# Patient Record
Sex: FEMALE | Race: WHITE | Employment: OTHER | URBAN - METROPOLITAN AREA
[De-identification: names, ages, dates, MRNs, and addresses within clinical notes are randomized per-mention and may not be internally consistent; named-entity substitution may affect disease eponyms.]

---

## 2016-12-08 LAB — HM DEXA SCAN: NORMAL

## 2017-02-20 ENCOUNTER — APPOINTMENT (OUTPATIENT)
Dept: GENERAL RADIOLOGY | Age: 67
End: 2017-02-20
Attending: PHYSICIAN ASSISTANT
Payer: MEDICARE

## 2017-02-20 ENCOUNTER — HOSPITAL ENCOUNTER (OUTPATIENT)
Age: 67
Discharge: HOME | End: 2017-02-20
Payer: MEDICARE

## 2017-02-20 VITALS
HEIGHT: 61 IN | BODY MASS INDEX: 31.53 KG/M2 | OXYGEN SATURATION: 100 % | SYSTOLIC BLOOD PRESSURE: 129 MMHG | WEIGHT: 167 LBS | TEMPERATURE: 98 F | HEART RATE: 86 BPM | RESPIRATION RATE: 20 BRPM | DIASTOLIC BLOOD PRESSURE: 73 MMHG

## 2017-02-20 DIAGNOSIS — S63.612A SPRAIN OF RIGHT MIDDLE FINGER, UNSPECIFIED SITE OF FINGER, INITIAL ENCOUNTER: Primary | ICD-10-CM

## 2017-02-20 DIAGNOSIS — S63.614A SPRAIN OF RIGHT RING FINGER, UNSPECIFIED SITE OF FINGER, INITIAL ENCOUNTER: ICD-10-CM

## 2017-02-20 PROCEDURE — 99213 OFFICE O/P EST LOW 20 MIN: CPT

## 2017-02-20 PROCEDURE — 99203 OFFICE O/P NEW LOW 30 MIN: CPT

## 2017-02-20 PROCEDURE — 73130 X-RAY EXAM OF HAND: CPT

## 2017-02-20 PROCEDURE — 29130 APPL FINGER SPLINT STATIC: CPT

## 2017-02-20 NOTE — ED PROVIDER NOTES
Patient Seen in: [facility] In [address] [place] [address],[address]    History   Patient presents with:  Hand Pain    Stated Complaint: r hand injury s/p fall at home    HPI    Patient is a pleasant 25-year-old female.   2 days prior to arrival, patient rolled out of b 20 mg by mouth every morning before breakfast. Indications: Gastroesophageal Reflux Disease   Magnesium 100 MG Oral Tab,  Take by mouth. GuaiFENesin (MUCUS RELIEF ADULT OR),  Take  by mouth as needed.        Family History   Problem Relation Age of Onset bilaterally. ED Course   Labs Reviewed - No data to display  Xr Hand (min 3 Views), Right (cpt=73130)    2/20/2017  PROCEDURE:  XR HAND (MIN 3 VIEWS), RIGHT (CPT=73130)  TECHNIQUE:  Three views were obtained. COMPARISON:  None.   INDICATIONS:  r hand Impression:  Sprain of right middle finger, unspecified site of finger, initial encounter  (primary encounter diagnosis)  Sprain of right ring finger, unspecified site of finger, initial encounter    Disposition:  Discharge    Follow-up:  [facility],

## 2017-02-20 NOTE — ED INITIAL ASSESSMENT (HPI)
Patient states she fell out of bed 2 nights ago and hit her hand on a plastic container. Patient is unable to make a fist and also has bruising noted to the right hand.

## 2017-03-20 ENCOUNTER — APPOINTMENT (OUTPATIENT)
Dept: GENERAL RADIOLOGY | Age: 67
End: 2017-03-20
Attending: FAMILY MEDICINE
Payer: MEDICARE

## 2017-03-20 ENCOUNTER — HOSPITAL ENCOUNTER (OUTPATIENT)
Age: 67
Discharge: HOME | End: 2017-03-20
Attending: FAMILY MEDICINE
Payer: MEDICARE

## 2017-03-20 VITALS
DIASTOLIC BLOOD PRESSURE: 77 MMHG | SYSTOLIC BLOOD PRESSURE: 131 MMHG | BODY MASS INDEX: 32.1 KG/M2 | WEIGHT: 170 LBS | TEMPERATURE: 97 F | HEIGHT: 61 IN | HEART RATE: 68 BPM | RESPIRATION RATE: 20 BRPM | OXYGEN SATURATION: 97 %

## 2017-03-20 DIAGNOSIS — S62.654A CLOSED NONDISPLACED FRACTURE OF MIDDLE PHALANX OF RIGHT RING FINGER, INITIAL ENCOUNTER: ICD-10-CM

## 2017-03-20 DIAGNOSIS — S62.652A CLOSED NONDISPLACED FRACTURE OF MIDDLE PHALANX OF RIGHT MIDDLE FINGER, INITIAL ENCOUNTER: Primary | ICD-10-CM

## 2017-03-20 PROCEDURE — 26720 TREAT FINGER FRACTURE EACH: CPT

## 2017-03-20 PROCEDURE — 73130 X-RAY EXAM OF HAND: CPT

## 2017-03-20 PROCEDURE — 99213 OFFICE O/P EST LOW 20 MIN: CPT

## 2017-03-20 PROCEDURE — 99212 OFFICE O/P EST SF 10 MIN: CPT

## 2017-03-20 NOTE — ED INITIAL ASSESSMENT (HPI)
Pt states seen here 1 month ago for right hand injury s/p fall out of bed. States pain improved slightly but still with swelling to right 3rd and 4th fingers and pain to all fingers. Pain most prominent to 3rd and 4th. Denies numbness or tingling.

## 2017-03-21 NOTE — ED PROVIDER NOTES
Patient Seen in: [facility] In [address] [place] [address] Street,[address]    History   Patient presents with:  Upper Extremity Injury (musculoskeletal)    Stated Complaint: R HAND FINGER PAIN     HPI  27-year-old female coming in with complaints of right hand injury status Indications: Hayfever   GuaiFENesin (MUCUS RELIEF ADULT OR),  Take  by mouth as needed.    omeprazole (PRILOSEC) 20 MG Oral Capsule Delayed Release,  Take 20 mg by mouth every morning before breakfast. Indications: Gastroesophageal Reflux Disease       Fami normal   - Cap refill: normal   - sensation: rylie   - Motor exam/strength/hand : as above - normal strength     ED Course   Labs Reviewed - No data to display    Orders Placed This Encounter  XR HAND (MIN 3 VIEWS), RIGHT (CPT=60457) Once  Order Commen fracture dislocation. This is not definite.    Dictated by: Renaldo Malone MD on 3/20/2017 at 19:39     Approved by: Renaldo Malone MD            Xr Hand (min 3 Views), Right (cpt=73130)    2/20/2017  PROCEDURE:  XR HAND (MIN 3 VIEWS), RIGHT (CPT=7313 finger, initial encounter  (primary encounter diagnosis)  Closed nondisplaced fracture of middle phalanx of right ring finger, initial encounter    Disposition:  Discharge    Follow-up:  Garo Rand, [address] [address]

## 2017-04-28 PROBLEM — S62.654A CLOSED NONDISPLACED FRACTURE OF MIDDLE PHALANX OF RIGHT RING FINGER, INITIAL ENCOUNTER: Status: ACTIVE | Noted: 2017-04-28

## 2019-08-01 NOTE — ED AVS SNAPSHOT
[facility] in [address]    [address]    Phone:  [phone]    Fax:  Debi   MRN: [id number]    Department:  THE [facility] in [address] [place] [address],[address]   Date of Visit:  3/20/2017 Loli Newsome 25, [place] ProcXimena Caro 1   [phone]       To Check ER Wait Times:  TEXT 'ERwait' to 68361      Click [email or web address]      Or call [phone]    If you have any problems with your follow-up, please call our  at [phone] I have read and understand the instructions given to me by my caregivers. 24-Hour Pharmacies        Pharmacy Address Phone Number   [facility] [address] ([address]) Erinn Kim TECHNIQUE:  Three views were obtained. COMPARISON:  RENEE JOHNSTON HAND (MIN 3 VIEWS), RIGHT (CPT=73130), 2/20/2017, 16:38.      INDICATIONS:  R HAND FINGER PAIN     PATIENT STATED HISTORY: (As transcribed by Technologist)  Injury one month ago to [facility] For medical emergencies, dial 911. Chonodrocutaneous Helical Advancement Flap Text: The defect edges were debeveled with a #15 scalpel blade.  Given the location of the defect and the proximity to free margins a chondrocutaneous helical advancement flap was deemed most appropriate.  Using a sterile surgical marker, the appropriate advancement flap was drawn incorporating the defect and placing the expected incisions within the relaxed skin tension lines where possible.    The area thus outlined was incised deep to adipose tissue with a #15 scalpel blade.  The skin margins were undermined to an appropriate distance in all directions utilizing iris scissors.

## 2020-10-05 ENCOUNTER — OFFICE VISIT (OUTPATIENT)
Dept: NEUROLOGY | Facility: CLINIC | Age: 70
End: 2020-10-05
Payer: MEDICARE

## 2020-10-05 VITALS
WEIGHT: 170 LBS | SYSTOLIC BLOOD PRESSURE: 132 MMHG | DIASTOLIC BLOOD PRESSURE: 88 MMHG | BODY MASS INDEX: 32 KG/M2 | HEART RATE: 88 BPM | RESPIRATION RATE: 14 BRPM

## 2020-10-05 DIAGNOSIS — R42 DIZZINESS: Primary | ICD-10-CM

## 2020-10-05 PROCEDURE — 99204 OFFICE O/P NEW MOD 45 MIN: CPT | Performed by: OTHER

## 2020-10-05 NOTE — PROGRESS NOTES
[facility]    Date of consult: 10/5/2020    CC/Reason for consult: dizziness, leg movement, memory issue  Consult Requested by Honey Ramírez MD    HPI: [place] is a 69year old female with past medical history as listed be Gastroesophageal Reflux Disease, Disp: , Rfl:   •  D3-50 1.25 MG (70521 UT) Oral Cap, Take 1.25 mg by mouth once a week., Disp: , Rfl:   •  Potassium Chloride ER 20 MEQ Oral Tab CR, Take 2 tablets by mouth 2 (two) times daily. , Disp: , Rfl:   Allergies: symmetric  Gait:deferred, on wheelchair  Romberg: deferred  Neck: supple    Data Reviewed on 10/5/2020  Notes Reviewed on 10/5/2020  Labs Reviewed  on 10/5/2020    Assessment & Plan:  Overall complicated, multiple neurological conditions that are challengi

## 2020-10-05 NOTE — PROGRESS NOTES
Pt here as n/p. Referred for dizziness, involuntary leg movements and some memory loss.   Pt  states it's been going on since surgery in august.

## 2020-10-09 ENCOUNTER — NURSE ONLY (OUTPATIENT)
Dept: ELECTROPHYSIOLOGY | Facility: HOSPITAL | Age: 70
End: 2020-10-09
Attending: Other
Payer: MEDICARE

## 2020-10-09 DIAGNOSIS — R42 DIZZINESS: ICD-10-CM

## 2020-10-09 PROCEDURE — 95816 EEG AWAKE AND DROWSY: CPT | Performed by: OTHER

## 2020-10-13 NOTE — PROCEDURES
Date of Procedure: 10/9/2020    Procedure: EEG (ELECTROENCEPHALOGRAM)     DX:DIZZINESS  HX:A 69YO FEMALE WHO PRESENTS WITH SOME DIZZINESS, CONFUSION, FORGETFULLNESS, MEMORY LOSS. PT COMPLAINS OF ALL THESE SYMPTOMS.  PT ALSO HAS SOME INVOLUNTARY LEG Honora Se

## 2020-10-14 ENCOUNTER — HOSPITAL ENCOUNTER (OUTPATIENT)
Dept: MRI IMAGING | Age: 70
Discharge: HOME | End: 2020-10-14
Attending: Other
Payer: MEDICARE

## 2020-10-14 DIAGNOSIS — R42 DIZZINESS: ICD-10-CM

## 2020-10-14 PROCEDURE — 70544 MR ANGIOGRAPHY HEAD W/O DYE: CPT | Performed by: OTHER

## 2020-10-14 PROCEDURE — 70547 MR ANGIOGRAPHY NECK W/O DYE: CPT | Performed by: OTHER

## 2020-10-14 PROCEDURE — 70551 MRI BRAIN STEM W/O DYE: CPT | Performed by: OTHER

## 2020-10-21 PROBLEM — F33.0 MAJOR DEPRESSIVE DISORDER, RECURRENT EPISODE, MILD DEGREE (HCC): Status: ACTIVE | Noted: 2020-10-21

## 2020-10-21 PROBLEM — R06.6 HICCOUGHS: Status: ACTIVE | Noted: 2020-10-21

## 2020-10-21 PROBLEM — G25.81 RESTLESS LEG SYNDROME: Status: ACTIVE | Noted: 2020-10-21

## 2020-10-21 PROBLEM — R41.3 MEMORY DEFICITS: Status: ACTIVE | Noted: 2020-10-21

## 2020-11-05 ENCOUNTER — OFFICE VISIT (OUTPATIENT)
Dept: NEUROLOGY | Facility: CLINIC | Age: 70
End: 2020-11-05
Payer: MEDICARE

## 2020-11-05 VITALS
SYSTOLIC BLOOD PRESSURE: 136 MMHG | BODY MASS INDEX: 24 KG/M2 | HEART RATE: 84 BPM | WEIGHT: 127 LBS | DIASTOLIC BLOOD PRESSURE: 70 MMHG | RESPIRATION RATE: 16 BRPM

## 2020-11-05 DIAGNOSIS — R42 DIZZINESS: Primary | ICD-10-CM

## 2020-11-05 PROCEDURE — 99214 OFFICE O/P EST MOD 30 MIN: CPT | Performed by: OTHER

## 2020-11-05 RX ORDER — METOCLOPRAMIDE 5 MG/1
1 TABLET ORAL 2 TIMES DAILY
COMMUNITY
Start: 2020-10-28

## 2020-11-05 NOTE — PROGRESS NOTES
[facility] Neurology outpatient progress note  Date of service: 11/5/2020    Patient here for a follow-up visit for dizziness, leg movement, hiccups. Since last visit her PCP started reglan for hiccups; she had MRI and EEG done, we reviewed results today.   Her di Calcium (LIPITOR) 40 MG Oral Tab, Take 40 mg by mouth nightly., Disp: , Rfl:   •  omeprazole (PRILOSEC) 20 MG Oral Capsule Delayed Release, Take 20 mg by mouth every morning before breakfast. Indications: Gastroesophageal Reflux Disease, Disp: , Rfl:   • disorder     Plan:  Testes results reviewed  Pt is on reglan by primary, seems slightly better per pt but I do not recommend long term use of such medication  I will see pt in one month, and reassess, perhaps try different medication for her hiccups, tremo

## 2020-11-18 ENCOUNTER — OFFICE VISIT (OUTPATIENT)
Dept: PHYSICAL THERAPY | Age: 70
End: 2020-11-18
Attending: FAMILY MEDICINE
Payer: MEDICARE

## 2020-11-18 PROCEDURE — 97163 PT EVAL HIGH COMPLEX 45 MIN: CPT

## 2020-11-18 PROCEDURE — 97110 THERAPEUTIC EXERCISES: CPT

## 2020-11-18 NOTE — PROGRESS NOTES
VESTIBULAR EVALUATION:   Referring Physician: Dr. Millie Hale  Diagnosis: Dizziness (R42)      Date of Service: 11/18/2020     PATIENT Elsi Huffman is a 69year old female who presents to therapy today with reports of dizziness for approx the past 3 turns or movement. Social history:  Lives with , no stairs  [place] describes prior level of function independent.   Has not driven for 2 years  Pt goals include get rid of the dizziness, be able to walk and get better balance  Past medical history wa [facility]): N/A     Postural Control:   Romberg: EO 30 sec, EC 3 sec   SLS: unable       Functional Mobility:   Gait: pt ambulates on level ground with assistive device of RW or SBA/DGA of . Or prefers to touch wall/furniture.  at home     Today's Treatme exercise program development and instruction, patient/family education, balance training, canalith repositioning maneuver, eye/head coordination exercises, sensory organization training, optokinetic stimulation , gait training and strengthening.      Oma Cain

## 2020-11-19 ENCOUNTER — OFFICE VISIT (OUTPATIENT)
Dept: PHYSICAL THERAPY | Age: 70
End: 2020-11-19
Attending: Other
Payer: MEDICARE

## 2020-11-19 ENCOUNTER — APPOINTMENT (OUTPATIENT)
Dept: PHYSICAL THERAPY | Age: 70
End: 2020-11-19
Attending: FAMILY MEDICINE
Payer: MEDICARE

## 2020-11-19 PROCEDURE — 97110 THERAPEUTIC EXERCISES: CPT

## 2020-11-19 PROCEDURE — 97112 NEUROMUSCULAR REEDUCATION: CPT

## 2020-11-19 NOTE — PROGRESS NOTES
Dx: Dizziness (R42)            Insurance (Authorized # of Visits):  8           Authorizing Physician: Dr. Nathaniel Garcia MD visit: 11/23  Fall Risk: elevated         Precautions: cancer             Subjective: Doing the exercises with help of .   Sasha in place with RW       Charges: TEx2  NR x 2       Total Timed Treatment: 55 min  Total Treatment Time: 55 min

## 2020-11-23 ENCOUNTER — VIRTUAL PHONE E/M (OUTPATIENT)
Dept: NEUROLOGY | Facility: CLINIC | Age: 70
End: 2020-11-23
Payer: MEDICARE

## 2020-11-23 ENCOUNTER — OFFICE VISIT (OUTPATIENT)
Dept: PHYSICAL THERAPY | Age: 70
End: 2020-11-23
Attending: Other
Payer: MEDICARE

## 2020-11-23 DIAGNOSIS — R42 DIZZINESS: Primary | ICD-10-CM

## 2020-11-23 PROCEDURE — 99442 PHONE E/M BY PHYS 11-20 MIN: CPT | Performed by: OTHER

## 2020-11-23 PROCEDURE — 97110 THERAPEUTIC EXERCISES: CPT

## 2020-11-23 PROCEDURE — 97112 NEUROMUSCULAR REEDUCATION: CPT

## 2020-11-23 NOTE — PROGRESS NOTES
Dx: Dizziness (R42)            Insurance (Authorized # of Visits):  [facility] [facility] Physician: Dr. Pretty Brown  Next MD visit: 11/23  Fall Risk: elevated         Precautions: cancer             Subjective: Pt states she still gets a little dizzy when sh ASHANTI      NR 30'  Gait with RW head turns 100'x2  P bars balance  -lat walk 6L,   -DL heel raise 10x  -standing march with UE support  Standing head turns  STS 10x hands on knees 10x2  Wt shifts 10x   NR  Standing balance - march 10x2  Wt shifts 10x  DL nicole

## 2020-11-23 NOTE — PROGRESS NOTES
Virtual/Telephone Visit Note     Date of service: 11/23/2020    Prince Clay verbally consents to a Virtual/Telephone Visit service on 11/23/2020.   Patient understands and accepts financial responsibility for any deductible, co-insurance and/or co-pays ass (81938 UT) Oral Cap, Take 1.25 mg by mouth once a week., Disp: , Rfl:   •  Memantine HCl 10 MG Oral Tab, Take 10 mg by mouth daily. , Disp: , Rfl:   •  NIFEdipine ER 60 MG Oral Tablet 24 Hr, Take 60 mg by mouth daily.  ON EMPTY STOMACH, Disp: , Rfl:   •  O 11/23/2020    Assessment & Plan:  Dizziness  (primary encounter diagnosis): slightly better, some feature of postural dizziness     Plan:  Pt is on reglan by primary, I do not recommend long term use of such medication and advised 2 times per week as neede

## 2020-11-25 ENCOUNTER — OFFICE VISIT (OUTPATIENT)
Dept: PHYSICAL THERAPY | Age: 70
End: 2020-11-25
Attending: Other
Payer: MEDICARE

## 2020-11-25 PROCEDURE — 97110 THERAPEUTIC EXERCISES: CPT

## 2020-11-25 PROCEDURE — 97112 NEUROMUSCULAR REEDUCATION: CPT

## 2020-11-25 NOTE — PROGRESS NOTES
Dx: Dizziness (R42)            Insurance (Authorized # of Visits):  [facility] [facility] Physician: Dr. Lamonte Gutierrez  Next MD visit: 11/23  Fall Risk: elevated         Precautions: cancer 2001 hx             Subjective: Pt states she has \"a little bit of diz sec.  HEP additions TE 25'  Seated stepper 5'  Slant board calf stretch 30\"x4  Pt ed pacing vs habituation. Reviewed safety principles.  Use of RW and wider ASHANTI TE 10'  Seated stepper 5'  Slant board calf stretch 30\"x4   VOR    NR 30'  Gait with RW head t

## 2020-11-27 ENCOUNTER — APPOINTMENT (OUTPATIENT)
Dept: PHYSICAL THERAPY | Age: 70
End: 2020-11-27
Attending: Other
Payer: MEDICARE

## 2020-12-02 ENCOUNTER — OFFICE VISIT (OUTPATIENT)
Dept: PHYSICAL THERAPY | Age: 70
End: 2020-12-02
Attending: Other
Payer: MEDICARE

## 2020-12-02 DIAGNOSIS — R42 DIZZINESS: ICD-10-CM

## 2020-12-02 PROCEDURE — 97112 NEUROMUSCULAR REEDUCATION: CPT

## 2020-12-02 PROCEDURE — 97110 THERAPEUTIC EXERCISES: CPT

## 2020-12-02 NOTE — PROGRESS NOTES
Dx: Dizziness (R42)            Insurance (Authorized # of Visits):  [phone] Physician: Dr. Jeovany Garcia MD visit: 11/23  Fall Risk: elevated         Precautions: cancer 2001 hx             Subjective: Pt states she is feeling better.   She i stepper 5'  Slant board calf stretch 30\"x4  TUG with RW 17 sec. HEP additions TE 25'  Seated stepper 5'  Slant board calf stretch 30\"x4  Pt ed pacing vs habituation. Reviewed safety principles.  Use of RW and wider ASHANTI TE 10'  Seated stepper 5'  Slant taniya

## 2020-12-04 ENCOUNTER — OFFICE VISIT (OUTPATIENT)
Dept: PHYSICAL THERAPY | Age: 70
End: 2020-12-04
Attending: Other
Payer: MEDICARE

## 2020-12-04 DIAGNOSIS — R42 DIZZINESS: ICD-10-CM

## 2020-12-04 PROCEDURE — 97110 THERAPEUTIC EXERCISES: CPT

## 2020-12-04 PROCEDURE — 97112 NEUROMUSCULAR REEDUCATION: CPT

## 2020-12-04 NOTE — PROGRESS NOTES
Dx: Dizziness (R42)            Insurance (Authorized # of Visits):  [facility] Physician: Dr. Mariam Lincoln  Next MD visit: 11/23  Fall Risk: elevated         Precautions: cancer 2001 hx             Subjective: Pt states she has felt more balanced wa 30\"x4  TUG with RW 17 sec. HEP additions TE 25'  Seated stepper 5'  Slant board calf stretch 30\"x4  Pt ed pacing vs habituation. Reviewed safety principles.  Use of RW and wider ASHANTI TE 10'  Seated stepper 5'  Slant board calf stretch 30\"x4   TE  10'  Se

## 2020-12-07 ENCOUNTER — OFFICE VISIT (OUTPATIENT)
Dept: PHYSICAL THERAPY | Age: 70
End: 2020-12-07
Attending: Other
Payer: MEDICARE

## 2020-12-07 DIAGNOSIS — R42 DIZZINESS: ICD-10-CM

## 2020-12-07 PROCEDURE — 97112 NEUROMUSCULAR REEDUCATION: CPT

## 2020-12-07 PROCEDURE — 97110 THERAPEUTIC EXERCISES: CPT

## 2020-12-07 NOTE — PROGRESS NOTES
Dx: Dizziness (R42)            Insurance (Authorized # of Visits):  8           Authorizing Physician: Dr. Salazar Sosa  Next MD visit:   Fall Risk: elevated         Precautions: cancer 2001 hx             Subjective: Pt states she had been feeling good last wee 12/4/20  Tx#: 6/10 12/7/20  7/10     TE 25'  Seated stepper 5'  Slant board calf stretch 30\"x4  TUG with RW 17 sec. HEP additions TE 25'  Seated stepper 5'  Slant board calf stretch 30\"x4  Pt ed pacing vs habituation. Reviewed safety principles.  Use of Timed Treatment: 43 min  Total Treatment Time: 43 min

## 2020-12-10 ENCOUNTER — APPOINTMENT (OUTPATIENT)
Dept: PHYSICAL THERAPY | Age: 70
End: 2020-12-10
Attending: Other
Payer: MEDICARE

## 2020-12-14 ENCOUNTER — OFFICE VISIT (OUTPATIENT)
Dept: PHYSICAL THERAPY | Age: 70
End: 2020-12-14
Attending: Other
Payer: MEDICARE

## 2020-12-14 DIAGNOSIS — R42 DIZZINESS: ICD-10-CM

## 2020-12-14 PROCEDURE — 97112 NEUROMUSCULAR REEDUCATION: CPT

## 2020-12-14 PROCEDURE — 97110 THERAPEUTIC EXERCISES: CPT

## 2020-12-14 NOTE — PROGRESS NOTES
Dx: Dizziness (R42)            Insurance (Authorized # of Visits):  8           Authorizing Physician: Dr. Eleanor Bermudez  Next MD visit:   Fall Risk: elevated         Precautions: cancer 2001 hx             Subjective: Pt states she had a dizzy spell yesterday af TX#: 3/10 Date:  11/25/20               TX#: 4/10 Date: 12/2/20                TX#: 5/10 Date: 12/4/20  Tx#: 6/10 12/7/20  7/10 12/14/20  8/10    TE 25'  Seated stepper 5'  Slant board calf stretch 30\"x4  TUG with RW 17 sec.   HEP additions TE 25'  Seate trials  Standing in corner head turns, march, DL heel raises 10x2 with RW  STS 10x no UE's  Amb with RW head turns '                                                      HEP: handouts:   standing corner balance with support for safety.  Add slow head

## 2020-12-17 ENCOUNTER — APPOINTMENT (OUTPATIENT)
Dept: PHYSICAL THERAPY | Age: 70
End: 2020-12-17
Attending: Other
Payer: MEDICARE

## 2020-12-21 ENCOUNTER — OFFICE VISIT (OUTPATIENT)
Dept: PHYSICAL THERAPY | Age: 70
End: 2020-12-21
Attending: Other
Payer: MEDICARE

## 2020-12-21 DIAGNOSIS — R42 DIZZINESS: ICD-10-CM

## 2020-12-21 PROCEDURE — 97110 THERAPEUTIC EXERCISES: CPT

## 2020-12-21 PROCEDURE — 97112 NEUROMUSCULAR REEDUCATION: CPT

## 2020-12-21 NOTE — PROGRESS NOTES
Dx: Dizziness (R42)            Insurance (Authorized # of Visits):  8           Authorizing Physician: Dr. Anika Dumont  Next MD visit:   Fall Risk: elevated         Precautions: cancer 2001 hx             Subjective: Pt states dizziness is better but she will s TE 12'  Seated stepper 5'  Standing slant board calf stretch 3x  HEP review  Bridge 10x2 TE 12'  Seated stepper 5'  Standing slant board calf stretch 3x Visual motion  positional    NR 35'  Rhomberg eyes closed 4x trials    Standing balance - march 10x2  W

## 2020-12-28 ENCOUNTER — APPOINTMENT (OUTPATIENT)
Dept: PHYSICAL THERAPY | Age: 70
End: 2020-12-28
Attending: Other
Payer: MEDICARE

## 2020-12-28 ENCOUNTER — TELEPHONE (OUTPATIENT)
Dept: PHYSICAL THERAPY | Age: 70
End: 2020-12-28

## 2020-12-28 NOTE — TELEPHONE ENCOUNTER
Pt's  called to reschedule today's appt, pt overslept. States pt has followed up with PCP Dr. Gigi Alexis from [facility]. BP meds have been increased because BP was spiking. BP yesterday was 134/76.   Advised to continue to keep log and to follow u

## 2020-12-31 ENCOUNTER — OFFICE VISIT (OUTPATIENT)
Dept: PHYSICAL THERAPY | Age: 70
End: 2020-12-31
Attending: Other
Payer: MEDICARE

## 2020-12-31 PROCEDURE — 97110 THERAPEUTIC EXERCISES: CPT

## 2020-12-31 PROCEDURE — 97112 NEUROMUSCULAR REEDUCATION: CPT

## 2020-12-31 NOTE — PROGRESS NOTES
Dx: Dizziness (R42)            Insurance (Authorized # of Visits):  8           Authorizing Physician: Dr. Jp Ordonez  Next MD visit:  spring  Fall Risk: elevated         Precautions: cancer 2001 hx             Progress Summary  Pt has attended 10, cancelled 1 maintaining a straight line. --> met with RW  (I) stand eyes closed with narrow base of support and head movement for shower safety.  --> not met    Plan: Pt has completed 10/10 visits.   Pt would like to continue with HEP independently for the next 2-3 we precautions     NR 35'  Rhomberg eyes closed 4x trials    Standing balance - march 10x2  Wt shifts 10x2  DL heel raises 10x3  Head turns 10x    Sitting VOR 20\"x3 NR 33'  Rhomberg eyes closed 4x trials    Standing balance - march 10x2  Wt shifts 10x2  DL h

## 2021-03-03 DIAGNOSIS — Z23 NEED FOR VACCINATION: ICD-10-CM

## 2021-04-14 ENCOUNTER — TELEPHONE (OUTPATIENT)
Dept: PHYSICAL THERAPY | Age: 71
End: 2021-04-14

## 2021-04-15 NOTE — TELEPHONE ENCOUNTER
As discussed with pt, called to follow up regarding progress with PT exercises. Spoke with pt and , states she was hospitalized in Jan with infection but doing overall better now. Receiving home nursing and PT.

## 2021-08-24 ENCOUNTER — ORDER TRANSCRIPTION (OUTPATIENT)
Dept: ADMINISTRATIVE | Facility: HOSPITAL | Age: 71
End: 2021-08-24

## 2021-08-24 DIAGNOSIS — Z01.812 ENCOUNTER FOR PREPROCEDURE SCREENING LABORATORY TESTING FOR COVID-19: Primary | ICD-10-CM

## 2021-08-24 DIAGNOSIS — J44.9 COPD (CHRONIC OBSTRUCTIVE PULMONARY DISEASE) (HCC): Primary | ICD-10-CM

## 2021-08-24 DIAGNOSIS — Z20.822 ENCOUNTER FOR PREPROCEDURE SCREENING LABORATORY TESTING FOR COVID-19: Primary | ICD-10-CM

## 2021-08-28 ENCOUNTER — LAB ENCOUNTER (OUTPATIENT)
Dept: LAB | Age: 71
End: 2021-08-28
Attending: FAMILY MEDICINE
Payer: MEDICARE

## 2021-08-28 DIAGNOSIS — Z01.812 ENCOUNTER FOR PREPROCEDURE SCREENING LABORATORY TESTING FOR COVID-19: ICD-10-CM

## 2021-08-28 DIAGNOSIS — Z20.822 ENCOUNTER FOR PREPROCEDURE SCREENING LABORATORY TESTING FOR COVID-19: ICD-10-CM

## 2021-08-30 LAB — SARS-COV-2 RNA RESP QL NAA+PROBE: NOT DETECTED

## 2021-09-01 ENCOUNTER — RT VISIT (OUTPATIENT)
Dept: RESPIRATORY THERAPY | Facility: HOSPITAL | Age: 71
End: 2021-09-01
Attending: FAMILY MEDICINE
Payer: MEDICARE

## 2021-09-01 DIAGNOSIS — J44.9 COPD (CHRONIC OBSTRUCTIVE PULMONARY DISEASE) (HCC): ICD-10-CM

## 2021-09-01 PROCEDURE — 94010 BREATHING CAPACITY TEST: CPT

## 2021-09-03 NOTE — PROCEDURES
Findings:  FEV1 is 2.01L, 101% predicted. FVC is 2.25L, 87% predicted. FEV1/ FVC ratio is 0.90. The flow-volume loop demonstrates a normal pattern. Impression:  There is no airway obstruction on spirometry and visualized on flow-volume loop.   There a

## 2022-02-21 ENCOUNTER — TELEPHONE (OUTPATIENT)
Dept: PHYSICAL THERAPY | Age: 72
End: 2022-02-21

## 2022-02-22 NOTE — TELEPHONE ENCOUNTER
Pt and pt's  called today and left VM requesting to come back to PT. Returned pt call. Pt states same symptoms of dizziness has returned if she turns too quickly. No new or worsening symptoms. Reports currently feeling better than earlier today. Recommended contacting PCP to discuss symptoms and determine if PT referral is appropriate.

## 2022-03-03 ENCOUNTER — ORDER TRANSCRIPTION (OUTPATIENT)
Dept: PHYSICAL THERAPY | Facility: HOSPITAL | Age: 72
End: 2022-03-03

## 2022-03-16 ENCOUNTER — OFFICE VISIT (OUTPATIENT)
Dept: PHYSICAL THERAPY | Age: 72
End: 2022-03-16
Attending: FAMILY MEDICINE
Payer: MEDICARE

## 2022-03-16 DIAGNOSIS — R42 DIZZINESS: ICD-10-CM

## 2022-03-16 PROCEDURE — 97162 PT EVAL MOD COMPLEX 30 MIN: CPT

## 2022-03-16 PROCEDURE — 97110 THERAPEUTIC EXERCISES: CPT

## 2022-03-22 ENCOUNTER — TELEPHONE (OUTPATIENT)
Dept: PHYSICAL THERAPY | Facility: HOSPITAL | Age: 72
End: 2022-03-22

## 2022-03-23 ENCOUNTER — APPOINTMENT (OUTPATIENT)
Dept: PHYSICAL THERAPY | Age: 72
End: 2022-03-23
Attending: FAMILY MEDICINE
Payer: MEDICARE

## 2022-03-28 ENCOUNTER — TELEPHONE (OUTPATIENT)
Dept: PHYSICAL THERAPY | Facility: HOSPITAL | Age: 72
End: 2022-03-28

## 2022-03-30 ENCOUNTER — APPOINTMENT (OUTPATIENT)
Dept: PHYSICAL THERAPY | Age: 72
End: 2022-03-30
Attending: FAMILY MEDICINE
Payer: MEDICARE

## 2022-04-04 ENCOUNTER — OFFICE VISIT (OUTPATIENT)
Dept: PHYSICAL THERAPY | Age: 72
End: 2022-04-04
Attending: FAMILY MEDICINE
Payer: MEDICARE

## 2022-04-04 DIAGNOSIS — R42 DIZZINESS: ICD-10-CM

## 2022-04-04 PROCEDURE — 97110 THERAPEUTIC EXERCISES: CPT

## 2022-04-04 PROCEDURE — 97112 NEUROMUSCULAR REEDUCATION: CPT

## 2022-04-04 NOTE — PROGRESS NOTES
Dx: Dizziness (R42)            Insurance (Authorized # of Visits):  8           Authorizing Physician: Dr. Marybeth Garcia MD visit: as needed  Fall Risk: elevated     Precautions: fall risk, dementia           Subjective: Primary complaint is lightheadedness and off balance with walking and turning. Pt reports bladder infection last week and had to cancel PT; better now. Has appt scheduled with MD for follow up. Pain: 0/10    Objective:   DVA 3 line loss. TUG no device: 20 sec  =======================  EVAL:  Cervical spine ROM: Flex 30, Ext 40,  R ROT 70, L ROT 70. No complaints   Adverse neuro signs with ROM: yes no: no      Occulomotor & Vestibulo-Ocular Exam:  Spontaneous Nystagmus: room light: none   Smooth Pursuit: Negative   Saccades: Negative  Gaze Evoked Nystagmus:  room light: Negative; fixation blocked: Not Tested  Head Thrust: (+) R  VOR screen:  No symptoms       Positional Testing:   [place]-Hallpike: R (-), L (-)   Roll Test [facility]): N/A      Postural Control:   Romberg: EO 30 sec, EC 3 sec   SLS: unable          Assessment: Pt with memory loss associated with dementia diagnosis. Discussed strategies for compliance with HEP with pt and . Primary complaint is unsteadiness and lightheadness walking and turning. Worsening functional mobility over the last several months. Recommended continued use of RW for safety concerns. Goals:   (to be met in 10 visits)  Able to perform functional squat safely without aggravation of symptoms, to reach floor objects  Independent with HEP to abolish symptoms and restore postural balance. Decreased symptoms of dizziness 90% with all ADL's. Ambulate with assistive device if needed, with head turns without loss of balance and maintaining a straight line. (I) stand eyes closed with narrow base of support and head movement for shower safety. Plan: HEP review next visit.   Patient will be seen for 1-2 x/week or a total of 10 visits over a 90 day period. Treatment will include: home exercise program development and instruction, patient/family education, balance training, canalith repositioning maneuver, eye/head coordination exercises, sensory organization training, optokinetic stimulation , ROM, gait training and strengthening. Date: 4/4/2022  TX#: 2/10 Date:                 TX#: 3/ Date:                 TX#: 4/ Date:                 TX#: 5/ Date:    Tx#: 6/   TE  Nustep 5'  DL heel raises 10x2       LAQ 10x2  Standing march 10x2       Bridge 5\" holds 10x2       NR  STS 5x2 no UE's  Balance - wt shifts  Pt/family ed use of assistive device for safety, RW  Amb with CGA/'       HEP:      bridge, STS x10 with increased use of LE's         Charges: TE NRx2       Total Timed Treatment: 45 min  Total Treatment Time: 45 min

## 2022-04-13 ENCOUNTER — OFFICE VISIT (OUTPATIENT)
Dept: PHYSICAL THERAPY | Age: 72
End: 2022-04-13
Attending: FAMILY MEDICINE
Payer: MEDICARE

## 2022-04-13 DIAGNOSIS — R42 DIZZINESS: ICD-10-CM

## 2022-04-13 PROCEDURE — 97112 NEUROMUSCULAR REEDUCATION: CPT

## 2022-04-13 PROCEDURE — 97110 THERAPEUTIC EXERCISES: CPT

## 2022-04-13 PROCEDURE — 97116 GAIT TRAINING THERAPY: CPT

## 2022-04-13 NOTE — PROGRESS NOTES
Dx: Dizziness (R42)            Insurance (Authorized # of Visits):  8           Authorizing Physician: Dr. Odessia Burkitt Next MD visit: as needed  Fall Risk: elevated     Precautions: fall risk, dementia           Subjective:  Exercise paper by the sofa to remember. Pt reports she feels off balance today. \"Feels the fluttering in my body\".  notes tremors at time. Ongoing complaints of daily hiccups without relief. Pain: L shoulder intermittent pain, difficulty rating - chronic    Objective:   (+) R Head thrust     4/4/22  DVA 3 line loss. TUG no device: 20 sec  =======================  EVAL:  Cervical spine ROM: Flex 30, Ext 40,  R ROT 70, L ROT 70. No complaints   Adverse neuro signs with ROM: yes no: no      Occulomotor & Vestibulo-Ocular Exam:  Spontaneous Nystagmus: room light: none   Smooth Pursuit: Negative   Saccades: Negative  Gaze Evoked Nystagmus:  room light: Negative; fixation blocked: Not Tested  Head Thrust: (+) R  VOR screen:  No symptoms       Positional Testing:   [place]-Hallpike: R (-), L (-)   Roll Test [facility]): N/A      Postural Control:   Romberg: EO 30 sec, EC 3 sec   SLS: unable          Assessment: Symptom provocation with low level VOR exercises. Relief with rest. Advised more regular use of the RW for safety as well as symptom management. Education on grounding and pacing principles for symptom management as well as activity recommendations, short/frequent and safe walking program.          Goals:   (to be met in 10 visits)  Able to perform functional squat safely without aggravation of symptoms, to reach floor objects  Independent with HEP to abolish symptoms and restore postural balance. Decreased symptoms of dizziness 90% with all ADL's. Ambulate with assistive device if needed, with head turns without loss of balance and maintaining a straight line. (I) stand eyes closed with narrow base of support and head movement for shower safety.       Plan: VOR exercise review and upgrades if tolerated. Cont with amb with RW. Patient will be seen for 1-2 x/week or a total of 10 visits over a 90 day period. Treatment will include: home exercise program development and instruction, patient/family education, balance training, canalith repositioning maneuver, eye/head coordination exercises, sensory organization training, optokinetic stimulation , ROM, gait training and strengthening. Date: 4/4/2022  TX#: 2/10 Date: 4/13                TX#: 3/10 Date:                 TX#: 4/ Date:                 TX#: 5/ Date: Tx#: 6/   TE  Nustep 5'  DL heel raises 10x2 TE  Nustep 5'  HEP review          LAQ 10x2  Standing march 10x2 NR  Grounding principles  Sitting VOR low level 10\" x2  standing VOR 10\" x2      Bridge 5\" holds 10x2 Gait  Pt/family ed Review use of RW vs holding furniture, sizing. Short/safe frequent walking program  amb with '      NR  STS 5x2 no UE's  Balance - wt shifts  Pt/family ed use of assistive device for safety, RW  Amb with CGA/'       HEP:      bridge, STS x10 with increased use of LE's. Sitting VOR low level.            Charges: TE NR gait       Total Timed Treatment: 45 min  Total Treatment Time: 45 min

## 2022-04-20 ENCOUNTER — OFFICE VISIT (OUTPATIENT)
Dept: PHYSICAL THERAPY | Age: 72
End: 2022-04-20
Attending: FAMILY MEDICINE
Payer: MEDICARE

## 2022-04-20 DIAGNOSIS — R42 DIZZINESS: ICD-10-CM

## 2022-04-20 NOTE — PROGRESS NOTES
Pt arrived at PT appt with . Canceled appt for today due to pt not feeling well, reports very tired today. Pt would rather reschedule but was worried about canceling again.  states pt was in the ER [place] on Friday with stomach pains. Small block in intestine found, reports able to clear it and treat it without surgery. Will follow up with PCP tomorrow. Advised to cxl PT for today, contact MD or return to ED with new or worsening symptoms, and to get clearance to resume PT after PCP visit.

## 2022-04-27 ENCOUNTER — OFFICE VISIT (OUTPATIENT)
Dept: PHYSICAL THERAPY | Age: 72
End: 2022-04-27
Attending: FAMILY MEDICINE
Payer: MEDICARE

## 2022-04-27 DIAGNOSIS — R42 DIZZINESS: ICD-10-CM

## 2022-04-27 PROCEDURE — 97110 THERAPEUTIC EXERCISES: CPT

## 2022-04-27 PROCEDURE — 97112 NEUROMUSCULAR REEDUCATION: CPT

## 2022-04-27 NOTE — PROGRESS NOTES
Dx: Dizziness (R42)            Insurance (Authorized # of Visits):  8           Authorizing Physician: Dr. Yohan Garcia MD visit: as needed  Fall Risk: elevated     Precautions: fall risk, dementia           Subjective:  Feeling much better since last week. Has followed up with PCP and will also see GI specialist again. Has been referred to geriatric psychiatrist as well, to go over medications. Pain: 0/10    Objective:   New order scanned ok to resume PT   (+) R Head thrust   Amb without device to session, tends to hold walls,  - advised more regular use of walker     4/4/22  DVA 3 line loss. TUG no device: 20 sec  =======================  EVAL:  Cervical spine ROM: Flex 30, Ext 40,  R ROT 70, L ROT 70. No complaints   Adverse neuro signs with ROM: yes no: no      Occulomotor & Vestibulo-Ocular Exam:  Spontaneous Nystagmus: room light: none   Smooth Pursuit: Negative   Saccades: Negative  Gaze Evoked Nystagmus:  room light: Negative; fixation blocked: Not Tested  Head Thrust: (+) R  VOR screen:  No symptoms       Positional Testing:   [place]-Hallpike: R (-), L (-)   Roll Test [facility]): N/A      Postural Control:   Romberg: EO 30 sec, EC 3 sec   SLS: unable          Assessment: Medical issues have limited tolerance for activity and exercises. Pt with constant fatigue and will need brief rest breaks during session. Today, improvement since last week and able to resume light exercises. Poor gaze stability and with dizziness complaints easily aggravated with walking. Standing VOR too challenging currently; advised to continue with sitting VOR. Goals:   (to be met in 10 visits)  Able to perform functional squat safely without aggravation of symptoms, to reach floor objects  Independent with HEP to abolish symptoms and restore postural balance. Decreased symptoms of dizziness 90% with all ADL's.   Ambulate with assistive device if needed, with head turns without loss of balance and maintaining a straight line. (I) stand eyes closed with narrow base of support and head movement for shower safety. Plan: VOR exercise review and upgrade to standing if tolerated. Cont with amb with RW, symptom management and functional strengthening. Patient will be seen for 1-2 x/week or a total of 10 visits over a 90 day period. Treatment will include: home exercise program development and instruction, patient/family education, balance training, canalith repositioning maneuver, eye/head coordination exercises, sensory organization training, optokinetic stimulation , ROM, gait training and strengthening. Date: 4/4/2022  TX#: 2/10 Date: 4/13                TX#: 3/10 Date:4/27/22                 TX#: 4/10 Date:                 TX#: 5/ Date: Tx#: 6/   TE  Nustep 5'  DL heel raises 10x2 TE  Nustep 5'  HEP review     TE  Nustep 5'  Slant board calf stretch 3x     LAQ 10x2  Standing march 10x2 NR  Grounding principles  Sitting VOR low level 10\" x2  standing VOR 10\" x2 NR  Standing balance, grounding principles, pt ed review, cont to use RW at home  STS for home review 10x  Sitting VOR 15\"x2  Standing VOR  20\" slowly     Bridge 5\" holds 10x2 Gait  Pt/family ed Review use of RW vs holding furniture, sizing. Short/safe frequent walking program  amb with ' Hallway walk CGA no device 200', visual scan     NR  STS 5x2 no UE's  Balance - wt shifts  Pt/family ed use of assistive device for safety, RW  Amb with CGA/'       HEP:      bridge, STS x10 with increased use of LE's. Sitting VOR low level.            Charges: TE NR2     Total Timed Treatment: 45 min  Total Treatment Time: 45 min

## 2022-05-04 ENCOUNTER — TELEPHONE (OUTPATIENT)
Dept: PHYSICAL THERAPY | Age: 72
End: 2022-05-04

## 2022-05-04 ENCOUNTER — TELEPHONE (OUTPATIENT)
Dept: PHYSICAL THERAPY | Facility: HOSPITAL | Age: 72
End: 2022-05-04

## 2022-05-04 ENCOUNTER — APPOINTMENT (OUTPATIENT)
Dept: PHYSICAL THERAPY | Age: 72
End: 2022-05-04
Attending: FAMILY MEDICINE
Payer: MEDICARE

## 2022-05-04 NOTE — TELEPHONE ENCOUNTER
Message received; called pt/ back.  states had to cancel PT session today due to c/o severe abdominal pain and wanted to apologize for short notice. Pt resting now,  notes she may feel better but not sure. Advised return to ED if symptoms worsen or don't improve;  agreed.

## 2022-05-11 ENCOUNTER — OFFICE VISIT (OUTPATIENT)
Dept: PHYSICAL THERAPY | Age: 72
End: 2022-05-11
Attending: FAMILY MEDICINE
Payer: MEDICARE

## 2022-05-11 DIAGNOSIS — R42 DIZZINESS: ICD-10-CM

## 2022-05-11 PROCEDURE — 97110 THERAPEUTIC EXERCISES: CPT

## 2022-05-11 PROCEDURE — 97112 NEUROMUSCULAR REEDUCATION: CPT

## 2022-05-11 NOTE — PROGRESS NOTES
Dx: Dizziness (R42)            Insurance (Authorized # of Visits):  8           Authorizing Physician: Dr. Barron Edwards  Next MD visit: as needed  Fall Risk: elevated     Precautions: fall risk, dementia           Subjective: \"Feeling good today, has not had the dizziness\" \"A good day\"   states the severe GI pain went away after last episode. Pain: 0/10    Objective:   (+) R head thrust  (+) visual motion sensitivity  4/27/22  New order scanned ok to resume PT   (+) R Head thrust   Amb without device to session, tends to hold walls,  - advised more regular use of walker     4/4/22  DVA 3 line loss. TUG no device: 20 sec  =======================  EVAL:  Cervical spine ROM: Flex 30, Ext 40,  R ROT 70, L ROT 70. No complaints   Adverse neuro signs with ROM: yes no: no      Occulomotor & Vestibulo-Ocular Exam:  Spontaneous Nystagmus: room light: none   Smooth Pursuit: Negative   Saccades: Negative  Gaze Evoked Nystagmus:  room light: Negative; fixation blocked: Not Tested  Head Thrust: (+) R  VOR screen:  No symptoms       Positional Testing:   Ricky-Hallpike: R (-), L (-)   Roll Test [facility]): N/A      Postural Control:   Romberg: EO 30 sec, EC 3 sec   SLS: unable          Assessment: Improved overall symptoms today; pt ambulating during session without device (I). Remains challenge with low level gaze stabilization exercises. Goals:   (to be met in 10 visits)  Able to perform functional squat safely without aggravation of symptoms, to reach floor objects  Independent with HEP to abolish symptoms and restore postural balance. Decreased symptoms of dizziness 90% with all ADL's. Ambulate with assistive device if needed, with head turns without loss of balance and maintaining a straight line. (I) stand eyes closed with narrow base of support and head movement for shower safety. Plan: VOR exercise review and upgrade to standing if tolerated.  Cont with amb with RW, symptom management and functional strengthening. Patient will be seen for 1-2 x/week or a total of 10 visits over a 90 day period. Treatment will include: home exercise program development and instruction, patient/family education, balance training, canalith repositioning maneuver, eye/head coordination exercises, sensory organization training, optokinetic stimulation , ROM, gait training and strengthening. Date: 4/4/2022  TX#: 2/10 Date: 4/13                TX#: 3/10 Date:4/27/22                 TX#: 4/10 Date:  5/11/22                 TX#: 5/10 Date: Tx#: 6/   TE  Nustep 5'  DL heel raises 10x2 TE  Nustep 5'  HEP review     TE  Nustep 5'  Slant board calf stretch 3xTE TE  Nustep 5'  Standing squat 10x      LAQ 10x2  Standing march 10x2 NR  Grounding principles  Sitting VOR low level 10\" x2  standing VOR 10\" x2 NR  Standing balance, grounding principles, pt ed review, cont to use RW at home  STS for home review 10x  Sitting VOR 15\"x2  Standing VOR  20\" slowly NR  Pt ed review grounding principles  Standing balance:  -Rhomberg EC trials  -Rhomberg head turns 10x2  -wt shifts  STS 10x2 hands on knees    Bridge 5\" holds 10x2 Gait  Pt/family ed Review use of RW vs holding furniture, sizing. Short/safe frequent walking program  amb with ' Hallway walk CGA no device 200', visual scan Sitting VOR - slow pace tolerated 20\"x3  HEP review with pt/    NR  STS 5x2 no UE's  Balance - wt shifts  Pt/family ed use of assistive device for safety, RW  Amb with CGA/'       HEP:      bridge, STS x10 with increased use of LE's. Sitting VOR low level.   Safe walking program         Charges: TE NR2     Total Timed Treatment: 45 min  Total Treatment Time: 45 min

## 2022-05-18 ENCOUNTER — OFFICE VISIT (OUTPATIENT)
Dept: PHYSICAL THERAPY | Age: 72
End: 2022-05-18
Attending: FAMILY MEDICINE
Payer: MEDICARE

## 2022-05-18 DIAGNOSIS — R42 DIZZINESS: ICD-10-CM

## 2022-05-18 PROCEDURE — 97112 NEUROMUSCULAR REEDUCATION: CPT

## 2022-05-18 NOTE — PROGRESS NOTES
Dx: Dizziness (R42)            Insurance (Authorized # of Visits):  8           Authorizing Physician: Dr. Nayla Shin  Next MD visit: as needed  Fall Risk: elevated     Precautions: fall risk, dementia           Subjective:  \"A bit shaky today, but not as bad as it has been. Stomach issues also feeling better\". Gets the hiccups every day. Pain: 0/10    Objective:   5/18/22  TUG 12 sec no device  30 sec STS 10x    5/11/22  (+) R head thrust  (+) visual motion sensitivity  4/27/22  New order scanned ok to resume PT   (+) R Head thrust   Amb without device to session, tends to hold walls,  - advised more regular use of walker     4/4/22  DVA 3 line loss. TUG no device: 20 sec  =======================  EVAL:  Cervical spine ROM: Flex 30, Ext 40,  R ROT 70, L ROT 70. No complaints   Adverse neuro signs with ROM: yes no: no      Occulomotor & Vestibulo-Ocular Exam:  Spontaneous Nystagmus: room light: none   Smooth Pursuit: Negative   Saccades: Negative  Gaze Evoked Nystagmus:  room light: Negative; fixation blocked: Not Tested  Head Thrust: (+) R  VOR screen:  No symptoms       Positional Testing:   [place]-Hallpike: R (-), L (-)   Roll Test [facility]): N/A      Postural Control:   Romberg: EO 30 sec, EC 3 sec   SLS: unable          Assessment: Able to tolerate low level VOR, slow pace, with symptoms provoked. Symptoms also provoked with dynamic balance activities. Symptoms subside with rest 1-2 mins. Advised more consistent use of RW for safety and to help stabilize balance and symptoms. Goals:   (to be met in 10 visits)  Able to perform functional squat safely without aggravation of symptoms, to reach floor objects  Independent with HEP to abolish symptoms and restore postural balance. Decreased symptoms of dizziness 90% with all ADL's. Ambulate with assistive device if needed, with head turns without loss of balance and maintaining a straight line.    (I) stand eyes closed with narrow base of support and head movement for shower safety. Plan: VOR exercise review and upgrade to standing if tolerated. Cont with amb with RW, symptom management and functional strengthening. Patient will be seen for 1-2 x/week or a total of 10 visits over a 90 day period. Treatment will include: home exercise program development and instruction, patient/family education, balance training, canalith repositioning maneuver, eye/head coordination exercises, sensory organization training, optokinetic stimulation , ROM, gait training and strengthening. Date: 4/4/2022  TX#: 2/10 Date: 4/13                TX#: 3/10 Date:4/27/22                 TX#: 4/10 Date:  5/11/22                 TX#: 5/10 Date: 5/18/22  Tx#: 6/10   TE  Nustep 5'  DL heel raises 10x2 TE  Nustep 5'  HEP review     TE  Nustep 5'  Slant board calf stretch 3xTE TE  Nustep 5'  Standing squat 10x     NR  STS 10x arms crossed  Nustep 6'   LAQ 10x2  Standing march 10x2 NR  Grounding principles  Sitting VOR low level 10\" x2  standing VOR 10\" x2 NR  Standing balance, grounding principles, pt ed review, cont to use RW at home  STS for home review 10x  Sitting VOR 15\"x2  Standing VOR  20\" slowly NR  Pt ed review grounding principles  Standing balance:  -Rhomberg EC trials  -Rhomberg head turns 10x2  -wt shifts  STS 10x2 hands on knees Hallway dynamic balance - walk 200', adding slow head turns side/side, up/down SBA   Lat steps 20'x4 CGA  Sitting VOR 30\" slow pace x3 side/side, x3 up/down  VOR cxl sitting 10x3   Bridge 5\" holds 10x2 Gait  Pt/family ed Review use of RW vs holding furniture, sizing. Short/safe frequent walking program  amb with ' Hallway walk CGA no device 200', visual scan Sitting VOR - slow pace tolerated 20\"x3  HEP review with pt/    NR  STS 5x2 no UE's  Balance - wt shifts  Pt/family ed use of assistive device for safety, RW  Amb with CGA/'       HEP:      bridge, STS x10 with increased use of LE's. Sitting VOR low level.   Safe walking program         Charges: NR3     Total Timed Treatment: 45 min  Total Treatment Time: 45 min

## 2022-06-06 ENCOUNTER — TELEPHONE (OUTPATIENT)
Dept: PHYSICAL THERAPY | Facility: HOSPITAL | Age: 72
End: 2022-06-06

## 2022-06-10 ENCOUNTER — TELEPHONE (OUTPATIENT)
Dept: PHYSICAL THERAPY | Facility: HOSPITAL | Age: 72
End: 2022-06-10

## 2022-06-13 ENCOUNTER — APPOINTMENT (OUTPATIENT)
Dept: PHYSICAL THERAPY | Age: 72
End: 2022-06-13
Attending: FAMILY MEDICINE
Payer: MEDICARE

## 2022-06-13 ENCOUNTER — TELEPHONE (OUTPATIENT)
Dept: PHYSICAL THERAPY | Age: 72
End: 2022-06-13

## 2022-06-13 NOTE — TELEPHONE ENCOUNTER
Called pt and LM to follow up regarding no show for PT today.  called back reports pt having GI procedure this week. Discussed holding further PT  Visits at this time as pt follows up for medical appts. Reports pt has had 3-4 days in a row without the dizziness and was walking outside but then will also have bad days. Will follow up with pt in about a month.  agrees. Verbal and phone consent to  noted.

## 2022-06-22 ENCOUNTER — APPOINTMENT (OUTPATIENT)
Dept: PHYSICAL THERAPY | Age: 72
End: 2022-06-22
Attending: FAMILY MEDICINE
Payer: MEDICARE

## 2022-06-29 ENCOUNTER — APPOINTMENT (OUTPATIENT)
Dept: PHYSICAL THERAPY | Age: 72
End: 2022-06-29
Attending: FAMILY MEDICINE
Payer: MEDICARE

## 2022-08-01 ENCOUNTER — TELEPHONE (OUTPATIENT)
Dept: PHYSICAL THERAPY | Age: 72
End: 2022-08-01

## 2022-08-01 NOTE — TELEPHONE ENCOUNTER
Pt/ called to return to PT. States he has been attempting to obtain new order from Dr. Batsheva Orellana. No recent orders scanned yet. Called pt back and LM to follow up.

## 2022-08-03 ENCOUNTER — HOSPITAL ENCOUNTER (OUTPATIENT)
Dept: MAMMOGRAPHY | Age: 72
Discharge: HOME | End: 2022-08-03
Attending: FAMILY MEDICINE
Payer: MEDICARE

## 2022-08-03 DIAGNOSIS — Z12.31 ENCOUNTER FOR SCREENING MAMMOGRAM FOR MALIGNANT NEOPLASM OF BREAST: ICD-10-CM

## 2022-08-03 LAB — HM MAMMOGRAPHY BILATERAL: NORMAL

## 2022-08-03 PROCEDURE — 77067 SCR MAMMO BI INCL CAD: CPT | Performed by: FAMILY MEDICINE

## 2022-08-03 PROCEDURE — 77063 BREAST TOMOSYNTHESIS BI: CPT | Performed by: FAMILY MEDICINE

## 2022-08-10 ENCOUNTER — APPOINTMENT (OUTPATIENT)
Dept: PHYSICAL THERAPY | Age: 72
End: 2022-08-10
Attending: FAMILY MEDICINE
Payer: MEDICARE

## 2022-08-15 ENCOUNTER — TELEPHONE (OUTPATIENT)
Dept: PHYSICAL THERAPY | Facility: HOSPITAL | Age: 72
End: 2022-08-15

## 2022-08-15 ENCOUNTER — ORDER TRANSCRIPTION (OUTPATIENT)
Dept: PHYSICAL THERAPY | Facility: HOSPITAL | Age: 72
End: 2022-08-15

## 2022-08-15 DIAGNOSIS — R42 DIZZINESS: Primary | ICD-10-CM

## 2022-08-17 ENCOUNTER — OFFICE VISIT (OUTPATIENT)
Dept: PHYSICAL THERAPY | Age: 72
End: 2022-08-17
Attending: FAMILY MEDICINE
Payer: MEDICARE

## 2022-08-17 DIAGNOSIS — R42 DIZZINESS: ICD-10-CM

## 2022-08-17 PROCEDURE — 97112 NEUROMUSCULAR REEDUCATION: CPT

## 2022-08-17 PROCEDURE — 97164 PT RE-EVAL EST PLAN CARE: CPT

## 2022-08-17 NOTE — PROGRESS NOTES
Dx: Dizziness (R42)            Insurance (Authorized # of Visits): 8/17/22 8 new POC           Authorizing Physician: Dr. Kalyan Linn  Next MD visit: as needed  Fall Risk: elevated     Precautions: fall risk, dementia           Progress Summary  Pt had attended 6 visits earlier this year with last visit 5/18/22. Multiple cancels due to other medical issues. Today pt has returned to PT with new order.  present to help with history due to pt decreased memory. Reports overall improvement but still with ongoing dizziness. Aggravated especially with quicker movements and with getting out of a chair. Reports regularly checks BP which has been under control, approx 140/70. Reports not needing the rolling walker as much; will still hold on to walls in the house or  when out of the house. No recent falls. More recently reports feeling weaker and has had to sit more because of the dizziness and recent medical issues. Has not been able to go with  for errands and walks like she was able to do. Has gone with  for some very brief errands but otherwise is not walking much. Pt/ goals to be stronger and get rid of the dizziness, be able to walk without losing balance. Pain: 0/10. Does report chronic LBP/stiffness. Objective:   Postural Control:   Romberg: EO 30 sec, EC 4 sec with dizziness. Tandem Stance: unable   SLS: unable     Cervical spine ROM: Flex 30, Ext 40,  R ROT 70, L ROT 70. No complaints   Adverse neuro signs with ROM: yes no: no      Occulomotor & Vestibulo-Ocular Exam:  Spontaneous Nystagmus: room light: none   Smooth Pursuit: Negative   Saccades: Negative  Gaze Evoked Nystagmus:  room light: Negative  Head Thrust: (+) R    Functional Mobility:   Gait: pt ambulates on level ground without device. Prefers to hold  for stability. Avoids head turns.     30 sec sit to stand:  10x no UE's    ---------------------------      5/18/22  TUG 12 sec no device  30 sec STS 10x    5/11/22  (+) R head thrust  (+) visual motion sensitivity  4/27/22  New order scanned ok to resume PT   (+) R Head thrust   Amb without device to session, tends to hold walls,  - advised more regular use of walker     4/4/22  DVA 3 line loss. TUG no device: 20 sec    Assessment: Pt returns after 3 months with new order for dx: dizziness. Over the past months, pt functionally more sedentary due to dizziness and various medical issues, with resulting weakness, decreased balance and vestibular hypofunction. Dizziness symptoms easily provoked with quicker head movements or changes of position and VOR exercises. Able to tolerate low level VOR, slow pace, with symptoms provoked. Symptoms also provoked with dynamic balance activities. Symptoms subside with rest 1-2 mins. Advised more consistent use of RW for safety and to help stabilize balance and symptoms. Goals:   (to be met in 10 visits)  Sit <--> stand transfers without aggravation of dizziness. Able to perform functional squat safely without aggravation of symptoms, to reach floor objects  Independent with HEP to abolish symptoms and restore postural balance. Ambulate with assistive device if needed, with head turns without loss of balance and maintaining a straight line. (I) stand eyes closed with narrow base of support and head movement for shower safety. Plan:  Patient will be seen for 1-2 x/week or a total of 8 visits over a 90 day period. Treatment will include: home exercise program development and instruction, patient/family education, balance training, eye/head coordination exercises, sensory organization training, optokinetic stimulation , ROM, gait training and strengthening. Patient/Family/Caregiver was advised of these findings, precautions, and treatment options and has agreed to actively participate in planning and for this course of care.     Thank you for your referral. If you have any questions, please contact me at Dept: [phone]. Sincerely,  Electronically signed by therapist: Luda Berry, PT    Physician's certification required: Yes  Please co-sign or sign and return this letter via fax as soon as possible to [phone]. I certify the need for these services furnished under this plan of treatment and while under my care. X___________________________________________________ Date____________________    Certification From: 9/55/5603  To:11/15/2022        Date: 4/4/2022  TX#: 2/10 Date: 4/13                TX#: 3/10 Date:4/27/22                 TX#: 4/10 Date:  5/11/22                 TX#: 5/10 Date: 5/18/22  Tx#: 6/10 8/17/22  1/8     TE  Nustep 5'  DL heel raises 10x2 TE  Nustep 5'  HEP review     TE  Nustep 5'  Slant board calf stretch 3xTE TE  Nustep 5'  Standing squat 10x     NR  STS 10x arms crossed  Nustep 6' Re-eval       NR  Pt ed Grounding principles, STS technique. Pacing vs habituation  Postural control - standing balance. Safe short walking program       LAQ 10x2  Standing march 10x2 NR  Grounding principles  Sitting VOR low level 10\" x2  standing VOR 10\" x2 NR  Standing balance, grounding principles, pt ed review, cont to use RW at home  STS for home review 10x  Sitting VOR 15\"x2  Standing VOR  20\" slowly NR  Pt ed review grounding principles  Standing balance:  -Rhomberg EC trials  -Rhomberg head turns 10x2  -wt shifts  STS 10x2 hands on knees Hallway dynamic balance - walk 200', adding slow head turns side/side, up/down SBA   Lat steps 20'x4 CGA  Sitting VOR 30\" slow pace x3 side/side, x3 up/down  VOR cxl sitting 10x3      Bridge 5\" holds 10x2 Gait  Pt/family ed Review use of RW vs holding furniture, sizing.   Short/safe frequent walking program  amb with ' Hallway walk CGA no device 200', visual scan Sitting VOR - slow pace tolerated 20\"x3  HEP review with pt/       NR  STS 5x2 no UE's  Balance - wt shifts  Pt/family ed use of assistive device for safety, RW  Amb with CGA/'          HEP:      bridge, STS x10 with increased use of LE's. Sitting VOR low level. Safe walking program    8/17/22: to set up recumbent bike again at home.            Charges:re-eval  NRx1     Total Timed Treatment: 43 min  Total Treatment Time: 43 min

## 2022-08-18 LAB — COLONOSCOPY STUDY: NORMAL

## 2022-08-22 ENCOUNTER — APPOINTMENT (OUTPATIENT)
Dept: PHYSICAL THERAPY | Age: 72
End: 2022-08-22
Attending: FAMILY MEDICINE
Payer: MEDICARE

## 2022-08-29 ENCOUNTER — OFFICE VISIT (OUTPATIENT)
Dept: PHYSICAL THERAPY | Age: 72
End: 2022-08-29
Attending: FAMILY MEDICINE
Payer: MEDICARE

## 2022-08-29 PROCEDURE — 97112 NEUROMUSCULAR REEDUCATION: CPT

## 2022-08-29 NOTE — PROGRESS NOTES
Dx: Dizziness (R42)            Insurance (Authorized # of Visits): 8/17/22 8 new POC           Authorizing Physician: Dr. Radha Garcia MD visit: as needed  Fall Risk: elevated     Precautions: fall risk, dementia           Subjective: Have not been getting the dizzy attacks. Walking a bit more around the house but will still feel dizzy if I move too fast.    will do all the errands still; has not been able to go to a store because of the dizziness. Pain: 0/10. Does report chronic LBP/stiffness. Objective:   TUG no device:  14 sec    Postural Control:   Romberg: EO 30 sec, EC 4 sec with dizziness. Tandem Stance: unable   SLS: unable   --------------------  Re-EVAL:  Cervical spine ROM: Flex 30, Ext 40,  R ROT 70, L ROT 70. No complaints   Adverse neuro signs with ROM: yes no: no      Occulomotor & Vestibulo-Ocular Exam:  Spontaneous Nystagmus: room light: none   Smooth Pursuit: Negative   Saccades: Negative  Gaze Evoked Nystagmus:  room light: Negative  Head Thrust: (+) R    Functional Mobility:   Gait: pt ambulates on level ground without device. Prefers to hold  for stability. Avoids head turns. 30 sec sit to stand:  10x no UE's    ---------------------------      5/18/22  TUG 12 sec no device  30 sec STS 10x    5/11/22  (+) R head thrust  (+) visual motion sensitivity  4/27/22  New order scanned ok to resume PT   (+) R Head thrust   Amb without device to session, tends to hold walls,  - advised more regular use of walker     4/4/22  DVA 3 line loss. TUG no device: 20 sec    Assessment: Pt with chronic dizziness complaints, chronic hiccups, and dementia. Pt follows through with all exercises today with cuing. Challenged with low level vestibular exercises at slow pace. Dizziness symptoms easily provoked with quicker head movements or changes of position and VOR exercises. Goals:   (to be met in 10 visits)  Sit <--> stand transfers without aggravation of dizziness. Able to perform functional squat safely without aggravation of symptoms, to reach floor objects  Independent with HEP to abolish symptoms and restore postural balance. Ambulate with assistive device if needed, with head turns without loss of balance and maintaining a straight line. (I) stand eyes closed with narrow base of support and head movement for shower safety. Plan:  Patient will be seen for 1-2 x/week or a total of 8 visits over a 90 day period. Treatment will include: home exercise program development and instruction, patient/family education, balance training, eye/head coordination exercises, sensory organization training, optokinetic stimulation , ROM, gait training and strengthening. Certification From: 4/52/1646  To:11/15/2022        Date:  5/11/22                 TX#: 5/10 Date: 5/18/22  Tx#: 6/10 8/17/22  1/8 8/29/22  2/8    TE  Nustep 5'  Standing squat 10x     NR  STS 10x arms crossed  Nustep 6' Re-eval       NR  Pt ed Grounding principles, STS technique. Pacing vs habituation  Postural control - standing balance. Safe short walking program   NR  Nustep 5' with number search  STS 10x2 arms crossed  Seated ball catch 4' with breaks  Standing balance feet together EC trials 3'  Standing wt shifts with table ball roll  Sitting low level VOR cxl 10x  Sitting LE AROM 3'  Bridge 22x tone      NR  Pt ed review grounding principles  Standing balance:  -Rhomberg EC trials  -Rhomberg head turns 10x2  -wt shifts  STS 10x2 hands on knees Hallway dynamic balance - walk 200', adding slow head turns side/side, up/down SBA   Lat steps 20'x4 CGA  Sitting VOR 30\" slow pace x3 side/side, x3 up/down  VOR cxl sitting 10x3      Sitting VOR - slow pace tolerated 20\"x3  HEP review with pt/              HEP:      bridge, STS x10 with increased use of LE's. Sitting VOR low level. Safe walking program    8/17/22: to set up recumbent bike again at home.            Charges: NR x3  Total Timed Treatment: 43 min  Total Treatment Time: 43 min

## 2022-09-08 ENCOUNTER — APPOINTMENT (OUTPATIENT)
Dept: PHYSICAL THERAPY | Age: 72
End: 2022-09-08
Attending: FAMILY MEDICINE
Payer: MEDICARE

## 2022-09-08 ENCOUNTER — TELEPHONE (OUTPATIENT)
Dept: PHYSICAL THERAPY | Age: 72
End: 2022-09-08

## 2022-09-12 ENCOUNTER — TELEPHONE (OUTPATIENT)
Dept: SCHEDULING | Age: 72
End: 2022-09-12

## 2022-09-13 ENCOUNTER — OFFICE VISIT (OUTPATIENT)
Dept: PHYSICAL THERAPY | Age: 72
End: 2022-09-13
Attending: FAMILY MEDICINE
Payer: MEDICARE

## 2022-09-13 PROCEDURE — 97112 NEUROMUSCULAR REEDUCATION: CPT

## 2022-09-13 PROCEDURE — 97110 THERAPEUTIC EXERCISES: CPT

## 2022-09-13 NOTE — PROGRESS NOTES
Dx: Dizziness (R42)            Insurance (Authorized # of Visits): 8/17/22 8 new POC           Authorizing Physician: Dr. Freitas ref. provider found  Next MD visit: as needed  Fall Risk: elevated     Precautions: fall risk, dementia           Subjective:   Still a little dizzy if I get up too fast.    No spinning. Has not had pain and low back has been feeling better. Pain: 0/10. LBP relieved with Tylenol. Objective:   Firm Rhomberg:   Eyes open 30 sec    Eyes closed 20 sec  Foam Rhomberg 1-2 sec    8/29/22  TUG no device:  14 sec    Postural Control:   Romberg: EO 30 sec, EC 4 sec with dizziness. Tandem Stance: unable   SLS: unable   --------------------  Re-EVAL:  Cervical spine ROM: Flex 30, Ext 40,  R ROT 70, L ROT 70. No complaints   Adverse neuro signs with ROM: yes no: no      Occulomotor & Vestibulo-Ocular Exam:  Spontaneous Nystagmus: room light: none   Smooth Pursuit: Negative   Saccades: Negative  Gaze Evoked Nystagmus:  room light: Negative  Head Thrust: (+) R    Functional Mobility:   Gait: pt ambulates on level ground without device. Prefers to hold  for stability. Avoids head turns. 30 sec sit to stand:  10x no UE's    ---------------------------      5/18/22  TUG 12 sec no device  30 sec STS 10x    5/11/22  (+) R head thrust  (+) visual motion sensitivity  4/27/22  New order scanned ok to resume PT   (+) R Head thrust   Amb without device to session, tends to hold walls,  - advised more regular use of walker     4/4/22  DVA 3 line loss. TUG no device: 20 sec    Assessment:  is caretaker and noting \"more good days\". As medical issues have stabilized, pt has increased tolerance for walking and activity, has started recumbent bike at home. Goals:   (to be met in 10 visits)  Sit <--> stand transfers without aggravation of dizziness.     Able to perform functional squat safely without aggravation of symptoms, to reach floor objects  Independent with HEP to abolish symptoms and restore postural balance. Ambulate with assistive device if needed, with head turns without loss of balance and maintaining a straight line. (I) stand eyes closed with narrow base of support and head movement for shower safety. Plan:  Low level gaze stabilization next visit. Patient will be seen for 1-2 x/week or a total of 8 visits over a 90 day period. Treatment will include: home exercise program development and instruction, patient/family education, balance training, eye/head coordination exercises, sensory organization training, optokinetic stimulation , ROM, gait training and strengthening. Certification From: 5/29/5501  To:11/15/2022        Date:  5/11/22                 TX#: 5/10 Date: 5/18/22  Tx#: 6/10 8/17/22  1/8 8/29/22  2/8 9/13/22  3/8   TE  Nustep 5'  Standing squat 10x     NR  STS 10x arms crossed  Nustep 6' Re-eval       NR  Pt ed Grounding principles, STS technique. Pacing vs habituation  Postural control - standing balance.      Safe short walking program   NR  Nustep 5' with number search  STS 10x2 arms crossed  Seated ball catch 4' with breaks  Standing balance feet together EC trials 3'  Standing wt shifts with table ball roll  Sitting low level VOR cxl 10x  Sitting LE AROM 3'  Bridge 22x tone   NR  BOSU mini lunge 10x ea  Foam DL stance --> EC trials  Foam stance with head turns  Semi tandem stance L/R trials  Sitting VOR cxl 5x3  STS no UE's 10x   NR  Pt ed review grounding principles  Standing balance:  -Rhomberg EC trials  -Rhomberg head turns 10x2  -wt shifts  STS 10x2 hands on knees Hallway dynamic balance - walk 200', adding slow head turns side/side, up/down SBA   Lat steps 20'x4 CGA  Sitting VOR 30\" slow pace x3 side/side, x3 up/down  VOR cxl sitting 10x3   TE  LB stretch review with LTR, SKTC  Bridge 20x  Shuttle 4 bands 15x2 DL, 15x each SL   Sitting VOR - slow pace tolerated 20\"x3  HEP review with pt/              HEP:      bridge, STS x10 with increased use of LE's. Sitting VOR low level. Safe walking program.  recum bike at home.    LTR,SKTC      Charges: NR x2  TE x 1  Total Timed Treatment: 43 min  Total Treatment Time: 43 min

## 2022-09-15 RX ORDER — QUETIAPINE FUMARATE 50 MG/1
50 TABLET, EXTENDED RELEASE ORAL AT BEDTIME
COMMUNITY
End: 2022-10-26 | Stop reason: SDUPTHER

## 2022-09-15 RX ORDER — TRIAMTERENE AND HYDROCHLOROTHIAZIDE 37.5; 25 MG/1; MG/1
1 TABLET ORAL DAILY
COMMUNITY

## 2022-09-15 RX ORDER — ATORVASTATIN CALCIUM 40 MG/1
40 TABLET, FILM COATED ORAL DAILY
COMMUNITY
End: 2023-03-16

## 2022-09-15 RX ORDER — ESCITALOPRAM OXALATE 20 MG/1
20 TABLET ORAL DAILY
COMMUNITY
End: 2022-12-15

## 2022-09-15 RX ORDER — NIFEDIPINE 90 MG/1
90 TABLET, FILM COATED, EXTENDED RELEASE ORAL DAILY
COMMUNITY
End: 2022-11-10

## 2022-09-15 RX ORDER — MEMANTINE HYDROCHLORIDE 10 MG/1
10 TABLET ORAL 2 TIMES DAILY
COMMUNITY
End: 2022-09-23

## 2022-09-15 RX ORDER — OMEPRAZOLE 20 MG/1
20 CAPSULE, DELAYED RELEASE ORAL DAILY
COMMUNITY

## 2022-09-15 RX ORDER — HYDRALAZINE HYDROCHLORIDE 50 MG/1
50 TABLET, FILM COATED ORAL 2 TIMES DAILY
COMMUNITY

## 2022-09-15 RX ORDER — OXYBUTYNIN CHLORIDE 5 MG/1
5 TABLET ORAL DAILY
COMMUNITY
End: 2022-11-17

## 2022-09-20 ENCOUNTER — OFFICE VISIT (OUTPATIENT)
Dept: PHYSICAL THERAPY | Age: 72
End: 2022-09-20
Attending: FAMILY MEDICINE
Payer: MEDICARE

## 2022-09-20 PROCEDURE — 97110 THERAPEUTIC EXERCISES: CPT

## 2022-09-20 PROCEDURE — 97112 NEUROMUSCULAR REEDUCATION: CPT

## 2022-09-20 NOTE — PROGRESS NOTES
Dx: Dizziness (R42)            Insurance (Authorized # of Visits): 8/17/22 8 new POC           Authorizing Physician: Dr. Eric Garcia MD visit: as needed  Fall Risk: elevated     Precautions: fall risk, dementia           Subjective:  Feeling a little dizzy today if I move too fast.      C/o ongoing \"shaky\" complaints. Pain: 0/10. Objective:   /65   with exercise    Firm Rhomberg:   Eyes open 30 sec    Eyes closed 20 sec  Foam Rhomberg 1-2 sec    8/29/22  TUG no device:  14 sec    Postural Control:   Romberg: EO 30 sec, EC 4 sec with dizziness. Tandem Stance: unable   SLS: unable   --------------------  Re-EVAL:  Cervical spine ROM: Flex 30, Ext 40,  R ROT 70, L ROT 70. No complaints   Adverse neuro signs with ROM: yes no: no      Occulomotor & Vestibulo-Ocular Exam:  Spontaneous Nystagmus: room light: none   Smooth Pursuit: Negative   Saccades: Negative  Gaze Evoked Nystagmus:  room light: Negative  Head Thrust: (+) R    Functional Mobility:   Gait: pt ambulates on level ground without device. Prefers to hold  for stability. Avoids head turns. 30 sec sit to stand:  10x no UE's    ---------------------------      5/18/22  TUG 12 sec no device  30 sec STS 10x    5/11/22  (+) R head thrust  (+) visual motion sensitivity  4/27/22  New order scanned ok to resume PT   (+) R Head thrust   Amb without device to session, tends to hold walls,  - advised more regular use of walker     4/4/22  DVA 3 line loss. TUG no device: 20 sec    Assessment: Quick fatigue with exercises today and required brief sitting rest breaks. Able to tolerate 5' Nustep L4 today. Dizziness still provoked with standing exercises; relief with brief sitting breaks. Goals:   (to be met in 10 visits)  Sit <--> stand transfers without aggravation of dizziness.     Able to perform functional squat safely without aggravation of symptoms, to reach floor objects  Independent with HEP to abolish symptoms and restore postural balance. Ambulate with assistive device if needed, with head turns without loss of balance and maintaining a straight line. (I) stand eyes closed with narrow base of support and head movement for shower safety. Plan:  Low level gaze stabilization next visit. Patient will be seen for 1-2 x/week or a total of 8 visits over a 90 day period. Treatment will include: home exercise program development and instruction, patient/family education, balance training, eye/head coordination exercises, sensory organization training, optokinetic stimulation , ROM, gait training and strengthening. Certification From: 8/21/5371  To:11/15/2022        Date:  5/11/22                 TX#: 5/10 Date: 5/18/22  Tx#: 6/10 8/17/22  1/8 8/29/22  2/8 9/13/22  3/8 9/20/22  4/8    TE  Nustep 5'  Standing squat 10x     NR  STS 10x arms crossed  Nustep 6' Re-eval       NR  Pt ed Grounding principles, STS technique. Pacing vs habituation  Postural control - standing balance.      Safe short walking program   NR  Nustep 5' with number search  STS 10x2 arms crossed  Seated ball catch 4' with breaks  Standing balance feet together EC trials 3'  Standing wt shifts with table ball roll  Sitting low level VOR cxl 10x  Sitting LE AROM 3'  Bridge 22x tone   NR  BOSU mini lunge 10x ea  Foam DL stance --> EC trials  Foam stance with head turns  Semi tandem stance L/R trials  Sitting VOR cxl 5x3  STS no UE's 10x TE  Nustep 5'  gastroc stretch 3x30\"      NR  STS no UE 10x  Dynamic balance - lat steps HHA --> SBA 6L  Rhomberg EC 30\"x3 with breaks  Sitting VOR cxl with ball 5x2  BOSU mini lunge alt - 10x2 each    NR  Pt ed review grounding principles  Standing balance:  -Rhomberg EC trials  -Rhomberg head turns 10x2  -wt shifts  STS 10x2 hands on knees Hallway dynamic balance - walk 200', adding slow head turns side/side, up/down SBA   Lat steps 20'x4 CGA  Sitting VOR 30\" slow pace x3 side/side, x3 up/down  VOR cxl sitting 10x3 TE  LB stretch review with RAFAEL WHITFIELD  Bridge 20x  Shuttle 4 bands 15x2 DL, 15x each SL     Sitting VOR - slow pace tolerated 20\"x3  HEP review with pt/                  HEP:      bridge, STS x10 with increased use of LE's. Sitting VOR low level. Safe walking program.  recum bike at home.    RAFAEL WHITFIELD      Charges: NR x2  TE x 1  Total Timed Treatment: 40 min  Total Treatment Time: 40 min

## 2022-09-23 RX ORDER — MEMANTINE HYDROCHLORIDE 10 MG/1
TABLET ORAL
Qty: 60 TABLET | Refills: 5 | Status: SHIPPED | OUTPATIENT
Start: 2022-09-23 | End: 2023-04-18

## 2022-09-27 ENCOUNTER — OFFICE VISIT (OUTPATIENT)
Dept: PHYSICAL THERAPY | Age: 72
End: 2022-09-27
Attending: FAMILY MEDICINE
Payer: MEDICARE

## 2022-09-27 PROCEDURE — 97112 NEUROMUSCULAR REEDUCATION: CPT

## 2022-09-27 PROCEDURE — 97110 THERAPEUTIC EXERCISES: CPT

## 2022-09-27 NOTE — PROGRESS NOTES
Dx: Dizziness (R42)            Insurance (Authorized # of Visits): 8/17/22 8 new POC           Authorizing Physician: Dr. Emily Garcia MD visit: Oct  Fall Risk: elevated     Precautions: fall risk, dementia           Progress Summary  Pt has attended 11 total visits this year with cancels due to various medical issues in Physical Therapy. Subjective:  \"I get dizzy if I move too fast\"  Reports good and bad days. Reports sleeps a lot. Reports doing recumbent bike at home 15 mins but doesn't always remember to do it. Pain: 0/10. Objective:   Gait: Amb without device (I) during session. Gait stability can vary depending on the day and symptoms of dizziness. Pt will hold  or use RW to help manage symptoms. Recommended RW at bedside for trips to bathroom at night. Balance:  Firm Rhomberg:   Eyes open 30 sec    Eyes closed 20 sec  Foam Rhomberg 1-2 sec    8/29/22  TUG no device:  14 sec    (improved from 20 sec 4/2022     Occulomotor & Vestibulo-Ocular Exam:  Spontaneous Nystagmus: room light: none   Smooth Pursuit: Negative   Saccades: Negative  Gaze Evoked Nystagmus:  room light: Negative  Head Thrust: (+) R. Symptoms easily provoked with VOR testing. Assessment: Pt still remains limited functionally by frequent dizziness complaints. Symptoms can significantly vary depending on the day. Inconsistent follow through with HEP; Pt is attempting but multiple obstacles contributing including memory deficits, fatigue and medical issues. Promoting a low level HEP which pt can continue with at this time; with assist of written handouts and . Encouraging more regular follow through of HEP on the good days; symptoms management techniques on the bad days. Also recommended follow through with referral to geriatric psychiatrist to review medications;  reports he has the order but has not yet scheduled.             Certification From: 1/67/0796  To:11/15/2022    Goals:  Remain in progress  (to be met in 10 visits)  Sit <--> stand transfers without aggravation of dizziness. Able to perform functional squat safely without aggravation of symptoms, to reach floor objects  Independent with HEP to abolish symptoms and restore postural balance. --> met with written handouts/ assist.    Ambulate with assistive device if needed, with head turns without loss of balance and maintaining a straight line. (I) stand eyes closed with narrow base of support and head movement for shower safety. Plan:  As noted above, slow, variable progress depending on the day. Recommend d/c to HEP at this time. Pt also to follow through with referral to geriatric psychiatrist.    Pt may benefit from return to PT to complete remaining 4 visits over the next months if HEP needs to be modified. PCP follow up scheduled for early Oct.      Patient/Family/Caregiver was advised of these findings, precautions, and treatment options and has agreed to actively participate in planning and for this course of care. Thank you for your referral. If you have any questions, please contact me at Dept: [phone]. Sincerely,  Electronically signed by therapist: Tona Romberg, PT    [de-identified] certification required: No                      Date:  5/11/22                 TX#: 5/10 Date: 5/18/22  Tx#: 6/10 8/17/22  1/8 8/29/22  2/8 9/13/22  3/8 9/20/22  4/8 9/28/22  5/8   TE  Nustep 5'  Standing squat 10x     NR  STS 10x arms crossed  Nustep 6' Re-eval       NR  Pt ed Grounding principles, STS technique. Pacing vs habituation  Postural control - standing balance.      Safe short walking program   NR  Nustep 5' with number search  STS 10x2 arms crossed  Seated ball catch 4' with breaks  Standing balance feet together EC trials 3'  Standing wt shifts with table ball roll  Sitting low level VOR cxl 10x  Sitting LE AROM 3'  Bridge 22x tone   NR  BOSU mini lunge 10x ea  Foam DL stance --> EC trials  Foam stance with head turns  Semi tandem stance L/R trials  Sitting VOR cxl 5x3  STS no UE's 10x TE  Nustep 5'  gastroc stretch 3x30\"      NR  STS no UE 10x  Dynamic balance - lat steps HHA --> SBA 6L  Rhomberg EC 30\"x3 with breaks  Sitting VOR cxl with ball 5x2  BOSU mini lunge alt - 10x2 each TE  Reassess  Complete review of HEP with current written handouts including pt/ education. recum bike 15' goal at home. Bridge 10x       NR  Pt ed review grounding principles  Standing balance:  -Rhomberg EC trials  -Rhomberg head turns 10x2  -wt shifts  STS 10x2 hands on knees Hallway dynamic balance - walk 200', adding slow head turns side/side, up/down SBA   Lat steps 20'x4 CGA  Sitting VOR 30\" slow pace x3 side/side, x3 up/down  VOR cxl sitting 10x3   TE  LB stretch review with LTR, SKTC  Bridge 20x  Shuttle 4 bands 15x2 DL, 15x each SL  NR  Grounding principles  30' walk with 180 deg turn SBA  Corner balance feet together 30\" goal  Sitting VOR low level 15\" 2x  STS hands on knees 5x     Sitting VOR - slow pace tolerated 20\"x3  HEP review with pt/                  HEP:   handouts   bridge, STS x10 with increased use of LE's. Sitting VOR low level. Safe walking program. Corner balance with . recum bike at home.    LTR,RAFAEL for LBP      Charges: NR x2  TE x 2  Total Timed Treatment: 55 min  Total Treatment Time: 55 min

## 2022-10-03 ENCOUNTER — OFFICE VISIT (OUTPATIENT)
Dept: FAMILY MEDICINE | Age: 72
End: 2022-10-03

## 2022-10-03 DIAGNOSIS — Z93.3 COLOSTOMY STATUS (CMD): ICD-10-CM

## 2022-10-03 DIAGNOSIS — Z00.00 MEDICARE ANNUAL WELLNESS VISIT, SUBSEQUENT: Primary | ICD-10-CM

## 2022-10-03 DIAGNOSIS — R42 DIZZINESS: ICD-10-CM

## 2022-10-03 DIAGNOSIS — K21.9 GASTROESOPHAGEAL REFLUX DISEASE WITHOUT ESOPHAGITIS: ICD-10-CM

## 2022-10-03 DIAGNOSIS — F33.42 RECURRENT MAJOR DEPRESSIVE DISORDER, IN FULL REMISSION (CMD): ICD-10-CM

## 2022-10-03 DIAGNOSIS — E78.2 MIXED HYPERLIPIDEMIA: ICD-10-CM

## 2022-10-03 DIAGNOSIS — F41.9 ANXIETY: ICD-10-CM

## 2022-10-03 DIAGNOSIS — I10 ESSENTIAL (PRIMARY) HYPERTENSION: ICD-10-CM

## 2022-10-03 DIAGNOSIS — Z23 NEED FOR VACCINATION: ICD-10-CM

## 2022-10-03 PROBLEM — E78.5 HYPERLIPIDEMIA, UNSPECIFIED: Status: ACTIVE | Noted: 2021-01-20

## 2022-10-03 PROBLEM — G43.909 MIGRAINE, UNSPECIFIED, NOT INTRACTABLE, WITHOUT STATUS MIGRAINOSUS: Status: RESOLVED | Noted: 2021-01-20 | Resolved: 2022-10-03

## 2022-10-03 PROBLEM — G43.909 MIGRAINE, UNSPECIFIED, NOT INTRACTABLE, WITHOUT STATUS MIGRAINOSUS: Status: ACTIVE | Noted: 2021-01-20

## 2022-10-03 PROCEDURE — 90686 IIV4 VACC NO PRSV 0.5 ML IM: CPT | Performed by: FAMILY MEDICINE

## 2022-10-03 PROCEDURE — 99212 OFFICE O/P EST SF 10 MIN: CPT | Performed by: FAMILY MEDICINE

## 2022-10-03 PROCEDURE — G0008 ADMIN INFLUENZA VIRUS VAC: HCPCS | Performed by: FAMILY MEDICINE

## 2022-10-03 PROCEDURE — G0439 PPPS, SUBSEQ VISIT: HCPCS | Performed by: FAMILY MEDICINE

## 2022-10-03 ASSESSMENT — ENCOUNTER SYMPTOMS
RESPIRATORY NEGATIVE: 1
GASTROINTESTINAL NEGATIVE: 1
NEUROLOGICAL NEGATIVE: 1
PSYCHIATRIC NEGATIVE: 1
CONSTITUTIONAL NEGATIVE: 1

## 2022-10-03 ASSESSMENT — PAIN SCALES - GENERAL: PAINLEVEL: 0

## 2022-10-07 ENCOUNTER — LAB SERVICES (OUTPATIENT)
Dept: LAB | Age: 72
End: 2022-10-07

## 2022-10-07 DIAGNOSIS — Z00.00 MEDICARE ANNUAL WELLNESS VISIT, SUBSEQUENT: ICD-10-CM

## 2022-10-07 PROCEDURE — 85025 COMPLETE CBC W/AUTO DIFF WBC: CPT | Performed by: CLINICAL MEDICAL LABORATORY

## 2022-10-07 PROCEDURE — 36415 COLL VENOUS BLD VENIPUNCTURE: CPT | Performed by: FAMILY MEDICINE

## 2022-10-07 PROCEDURE — 80061 LIPID PANEL: CPT | Performed by: CLINICAL MEDICAL LABORATORY

## 2022-10-07 PROCEDURE — 80053 COMPREHEN METABOLIC PANEL: CPT | Performed by: CLINICAL MEDICAL LABORATORY

## 2022-10-07 PROCEDURE — 84443 ASSAY THYROID STIM HORMONE: CPT | Performed by: CLINICAL MEDICAL LABORATORY

## 2022-10-07 PROCEDURE — 36415 COLL VENOUS BLD VENIPUNCTURE: CPT | Performed by: INTERNAL MEDICINE

## 2022-10-08 LAB
ALBUMIN SERPL-MCNC: 4 G/DL (ref 3.6–5.1)
ALBUMIN/GLOB SERPL: 1.4 {RATIO} (ref 1–2.4)
ALP SERPL-CCNC: 97 UNITS/L (ref 45–117)
ALT SERPL-CCNC: 20 UNITS/L
ANION GAP SERPL CALC-SCNC: 9 MMOL/L (ref 7–19)
AST SERPL-CCNC: 16 UNITS/L
BASOPHILS # BLD: 0.1 K/MCL (ref 0–0.3)
BASOPHILS NFR BLD: 2 %
BILIRUB SERPL-MCNC: 0.6 MG/DL (ref 0.2–1)
BUN SERPL-MCNC: 17 MG/DL (ref 6–20)
BUN/CREAT SERPL: 21 (ref 7–25)
CALCIUM SERPL-MCNC: 9.3 MG/DL (ref 8.4–10.2)
CHLORIDE SERPL-SCNC: 106 MMOL/L (ref 97–110)
CHOLEST SERPL-MCNC: 154 MG/DL
CHOLEST/HDLC SERPL: 3.7 {RATIO}
CO2 SERPL-SCNC: 29 MMOL/L (ref 21–32)
CREAT SERPL-MCNC: 0.82 MG/DL (ref 0.51–0.95)
DEPRECATED RDW RBC: 47.5 FL (ref 39–50)
EOSINOPHIL # BLD: 0.2 K/MCL (ref 0–0.5)
EOSINOPHIL NFR BLD: 2 %
ERYTHROCYTE [DISTWIDTH] IN BLOOD: 13.8 % (ref 11–15)
FASTING DURATION TIME PATIENT: 12 HOURS (ref 0–999)
FASTING DURATION TIME PATIENT: 12 HOURS (ref 0–999)
GFR SERPLBLD BASED ON 1.73 SQ M-ARVRAT: 75 ML/MIN
GLOBULIN SER-MCNC: 2.8 G/DL (ref 2–4)
GLUCOSE SERPL-MCNC: 99 MG/DL (ref 70–99)
HCT VFR BLD CALC: 45.7 % (ref 36–46.5)
HDLC SERPL-MCNC: 42 MG/DL
HGB BLD-MCNC: 14.5 G/DL (ref 12–15.5)
IMM GRANULOCYTES # BLD AUTO: 0 K/MCL (ref 0–0.2)
IMM GRANULOCYTES # BLD: 0 %
LDLC SERPL CALC-MCNC: 80 MG/DL
LYMPHOCYTES # BLD: 1.5 K/MCL (ref 1–4)
LYMPHOCYTES NFR BLD: 21 %
MCH RBC QN AUTO: 29.8 PG (ref 26–34)
MCHC RBC AUTO-ENTMCNC: 31.7 G/DL (ref 32–36.5)
MCV RBC AUTO: 93.8 FL (ref 78–100)
MONOCYTES # BLD: 0.6 K/MCL (ref 0.3–0.9)
MONOCYTES NFR BLD: 9 %
NEUTROPHILS # BLD: 4.6 K/MCL (ref 1.8–7.7)
NEUTROPHILS NFR BLD: 66 %
NONHDLC SERPL-MCNC: 112 MG/DL
NRBC BLD MANUAL-RTO: 0 /100 WBC
PLATELET # BLD AUTO: 228 K/MCL (ref 140–450)
POTASSIUM SERPL-SCNC: 4.2 MMOL/L (ref 3.4–5.1)
PROT SERPL-MCNC: 6.8 G/DL (ref 6.4–8.2)
RBC # BLD: 4.87 MIL/MCL (ref 4–5.2)
SODIUM SERPL-SCNC: 140 MMOL/L (ref 135–145)
TRIGL SERPL-MCNC: 159 MG/DL
TSH SERPL-ACNC: 0.6 MCUNITS/ML (ref 0.35–5)
WBC # BLD: 7 K/MCL (ref 4.2–11)

## 2022-10-26 RX ORDER — QUETIAPINE FUMARATE 50 MG/1
TABLET, FILM COATED ORAL
Qty: 30 TABLET | Refills: 2 | Status: SHIPPED | OUTPATIENT
Start: 2022-10-26 | End: 2023-01-27

## 2022-11-10 RX ORDER — NIFEDIPINE 90 MG/1
TABLET, FILM COATED, EXTENDED RELEASE ORAL
Qty: 30 TABLET | Refills: 2 | Status: SHIPPED | OUTPATIENT
Start: 2022-11-10 | End: 2023-02-15

## 2022-11-17 RX ORDER — OXYBUTYNIN CHLORIDE 5 MG/1
TABLET ORAL
Qty: 90 TABLET | Refills: 1 | Status: SHIPPED | OUTPATIENT
Start: 2022-11-17 | End: 2023-06-16

## 2022-12-07 ENCOUNTER — TELEPHONE (OUTPATIENT)
Dept: INTERNAL MEDICINE | Age: 72
End: 2022-12-07

## 2022-12-07 VITALS
BODY MASS INDEX: 25.84 KG/M2 | WEIGHT: 140.43 LBS | HEART RATE: 87 BPM | SYSTOLIC BLOOD PRESSURE: 122 MMHG | OXYGEN SATURATION: 95 % | HEIGHT: 62 IN | RESPIRATION RATE: 17 BRPM | DIASTOLIC BLOOD PRESSURE: 74 MMHG

## 2022-12-07 ASSESSMENT — PATIENT HEALTH QUESTIONNAIRE - PHQ9
2. FEELING DOWN, DEPRESSED OR HOPELESS: SEVERAL DAYS
SUM OF ALL RESPONSES TO PHQ9 QUESTIONS 1 AND 2: 2
CLINICAL INTERPRETATION OF PHQ2 SCORE: NO FURTHER SCREENING NEEDED
SUM OF ALL RESPONSES TO PHQ9 QUESTIONS 1 AND 2: 2
1. LITTLE INTEREST OR PLEASURE IN DOING THINGS: SEVERAL DAYS

## 2022-12-07 ASSESSMENT — COGNITIVE AND FUNCTIONAL STATUS - GENERAL
BECAUSE OF A PHYSICAL, MENTAL, OR EMOTIONAL CONDITION, DO YOU HAVE DIFFICULTY DOING ERRANDS ALONE: YES
BECAUSE OF A PHYSICAL, MENTAL, OR EMOTIONAL CONDITION, DO YOU HAVE SERIOUS DIFFICULTY CONCENTRATING, REMEMBERING OR MAKING DECISIONS: YES
DO YOU HAVE DIFFICULTY DRESSING OR BATHING: NO
DO YOU HAVE SERIOUS DIFFICULTY WALKING OR CLIMBING STAIRS: YES

## 2022-12-11 ASSESSMENT — VISUAL ACUITY
OD_CC: 20/20
OS_CC: 20/20

## 2022-12-15 RX ORDER — ESCITALOPRAM OXALATE 20 MG/1
TABLET ORAL
Qty: 30 TABLET | Refills: 5 | Status: SHIPPED | OUTPATIENT
Start: 2022-12-15 | End: 2023-07-10

## 2023-01-27 RX ORDER — QUETIAPINE FUMARATE 50 MG/1
TABLET, FILM COATED ORAL
Qty: 30 TABLET | Refills: 2 | Status: SHIPPED | OUTPATIENT
Start: 2023-01-27 | End: 2023-05-02

## 2023-02-15 RX ORDER — NIFEDIPINE 90 MG/1
TABLET, FILM COATED, EXTENDED RELEASE ORAL
Qty: 30 TABLET | Refills: 2 | Status: SHIPPED | OUTPATIENT
Start: 2023-02-15 | End: 2023-05-10

## 2023-03-12 ENCOUNTER — EXTERNAL LAB (OUTPATIENT)
Dept: OTHER | Age: 73
End: 2023-03-12

## 2023-03-12 ENCOUNTER — EXTERNAL RECORD (OUTPATIENT)
Dept: HEALTH INFORMATION MANAGEMENT | Facility: OTHER | Age: 73
End: 2023-03-12

## 2023-03-12 LAB
ABSOLUTE NRBC (AUTO): 0 X10'3/MICROL
ALBUMIN SERPL-MCNC: 4.5 G/DL (ref 3.5–5.7)
ALP SERPL-CCNC: 92 UNITS/L (ref 34–104)
ALT SERPL-CCNC: 15 UNITS/L (ref 7–52)
ANION GAP SERPL CALC-SCNC: 6 MMOL/L (ref 6.2–14.7)
APPEARANCE UR: ABNORMAL
AST SERPL-CCNC: 14 UNITS/L (ref 13–39)
BACTERIA #/AREA URNS HPF: ABNORMAL /HPF
BACTERIA UR CULT: YES
BASOPHILS # BLD: 0.09 X10'3/MICROL (ref 0–0.11)
BASOPHILS NFR BLD: 1 %
BILIRUB SERPL-MCNC: 0.5 MG/DL (ref 0.3–1)
BILIRUB UR QL: NEGATIVE MG/DL
BUN SERPL-MCNC: 13 MG/DL (ref 7–25)
CALCIUM SERPL-MCNC: 9.2 MG/DL (ref 8.6–10.4)
CHLORIDE SERPL-SCNC: 106 MEQ/L (ref 98–107)
CO2 SERPL-SCNC: 30 MEQ/L (ref 21–31)
COLOR UR: YELLOW
CREAT SERPL-MCNC: 0.85 MG/DL (ref 0.6–1.2)
EOSINOPHIL # BLD: 0.09 X10'3/MICROL (ref 0.04–0.32)
EOSINOPHIL NFR BLD: 1 %
ERYTHROCYTE [DISTWIDTH] IN BLOOD: 13.5 % (ref 11.5–14.5)
EST CRCL: 50.1 ML/MIN
GFR SERPLBLD SCHWARTZ-ARVRAT: 72.7 ML/MIN/1.73 M2
GLUCOSE SERPL-MCNC: 99 MG/DL (ref 70–99)
GLUCOSE UR-MCNC: NEGATIVE MG/DL
HCT VFR BLD CALC: 46 % (ref 38–50)
HGB BLD-MCNC: 14.8 G/DL (ref 12.1–16.4)
HGB UR QL: NEGATIVE MG/DL
KETONES UR-MCNC: NEGATIVE MG/DL
LENGTH OF FAST TIME PATIENT: ABNORMAL H
LEUKOCYTE ESTERASE UR QL STRIP: 250 CELLS/MICROL
LIPASE SERPL-CCNC: 22 UNITS/L (ref 11–82)
LYMPHOCYTES # BLD: 1.17 X10'3/MICROL (ref 1.1–3.3)
LYMPHOCYTES NFR BLD: 16 %
MCH RBC QN AUTO: 29.5 PG (ref 26–34)
MCHC RBC AUTO-ENTMCNC: 32.2 G/DL (ref 30.6–37)
MCV RBC AUTO: 91.6 FL (ref 79–98)
MONOCYTES # BLD: 0.57 X10'3/MICROL (ref 0.15–0.58)
MONOCYTES NFR BLD: 8 %
NEUTROPHILS # BLD: 5.6 X10'3/MICROL (ref 1.8–9)
NEUTROPHILS NFR BLD: 74 %
NITRITE UR QL: ABNORMAL
NRBC BLD MANUAL-RTO: 0 %
PH UR: 6.5 [PH] (ref 5–8)
PLATELET # BLD: 198 X10'3/MICROL (ref 150–400)
POTASSIUM SERPL-SCNC: 3.9 MMOL/L (ref 3.5–5.1)
PROT SERPL-MCNC: 6.7 G/DL (ref 6.4–8.9)
PROT UR QL: 10 MG/DL
RBC # BLD: 5.02 X10'6/MICROL (ref 4–5.6)
RBC #/AREA URNS HPF: ABNORMAL /HPF (ref 0–2)
SODIUM SERPL-SCNC: 142 MEQ/L (ref 133–144)
SP GR UR: 1.02 (ref 1–1.03)
SPECIMEN SOURCE: ABNORMAL
TROPONIN I SERPL DL<=0.01 NG/ML-MCNC: 3 PG/ML
UROBILINOGEN UR QL: NORMAL MG/DL
WBC # BLD: 7.5 X10'3/MICROL (ref 4.5–11)
WBC #/AREA URNS HPF: ABNORMAL /HPF

## 2023-03-15 ENCOUNTER — EXTERNAL RECORD (OUTPATIENT)
Dept: HEALTH INFORMATION MANAGEMENT | Facility: OTHER | Age: 73
End: 2023-03-15

## 2023-03-16 ENCOUNTER — OFFICE VISIT (OUTPATIENT)
Dept: FAMILY MEDICINE | Age: 73
End: 2023-03-16

## 2023-03-16 VITALS
SYSTOLIC BLOOD PRESSURE: 138 MMHG | HEART RATE: 88 BPM | WEIGHT: 143.3 LBS | DIASTOLIC BLOOD PRESSURE: 88 MMHG | OXYGEN SATURATION: 97 % | HEIGHT: 62 IN | BODY MASS INDEX: 26.37 KG/M2

## 2023-03-16 DIAGNOSIS — Z93.3 COLOSTOMY STATUS (CMD): ICD-10-CM

## 2023-03-16 DIAGNOSIS — Z23 NEED FOR VACCINATION AGAINST STREPTOCOCCUS PNEUMONIAE: ICD-10-CM

## 2023-03-16 DIAGNOSIS — I10 ESSENTIAL (PRIMARY) HYPERTENSION: Primary | ICD-10-CM

## 2023-03-16 DIAGNOSIS — E78.2 MIXED HYPERLIPIDEMIA: ICD-10-CM

## 2023-03-16 DIAGNOSIS — K21.9 GASTROESOPHAGEAL REFLUX DISEASE WITHOUT ESOPHAGITIS: ICD-10-CM

## 2023-03-16 DIAGNOSIS — F33.42 RECURRENT MAJOR DEPRESSIVE DISORDER, IN FULL REMISSION (CMD): ICD-10-CM

## 2023-03-16 PROCEDURE — 90677 PCV20 VACCINE IM: CPT | Performed by: FAMILY MEDICINE

## 2023-03-16 PROCEDURE — 99214 OFFICE O/P EST MOD 30 MIN: CPT | Performed by: FAMILY MEDICINE

## 2023-03-16 PROCEDURE — G0009 ADMIN PNEUMOCOCCAL VACCINE: HCPCS | Performed by: FAMILY MEDICINE

## 2023-03-16 RX ORDER — CEFDINIR 300 MG/1
300 CAPSULE ORAL 2 TIMES DAILY
COMMUNITY
End: 2023-08-30 | Stop reason: ALTCHOICE

## 2023-03-16 RX ORDER — METRONIDAZOLE 500 MG/1
500 TABLET ORAL 2 TIMES DAILY
COMMUNITY
End: 2023-08-30 | Stop reason: ALTCHOICE

## 2023-03-16 RX ORDER — ATORVASTATIN CALCIUM 40 MG/1
TABLET, FILM COATED ORAL
Qty: 90 TABLET | Refills: 3 | Status: SHIPPED | OUTPATIENT
Start: 2023-03-16

## 2023-03-16 ASSESSMENT — ENCOUNTER SYMPTOMS
NEUROLOGICAL NEGATIVE: 1
RESPIRATORY NEGATIVE: 1
CONSTITUTIONAL NEGATIVE: 1
GASTROINTESTINAL NEGATIVE: 1
PSYCHIATRIC NEGATIVE: 1

## 2023-03-16 ASSESSMENT — PATIENT HEALTH QUESTIONNAIRE - PHQ9
1. LITTLE INTEREST OR PLEASURE IN DOING THINGS: NOT AT ALL
SUM OF ALL RESPONSES TO PHQ9 QUESTIONS 1 AND 2: 0
CLINICAL INTERPRETATION OF PHQ2 SCORE: NO FURTHER SCREENING NEEDED
2. FEELING DOWN, DEPRESSED OR HOPELESS: NOT AT ALL
SUM OF ALL RESPONSES TO PHQ9 QUESTIONS 1 AND 2: 0

## 2023-03-23 ENCOUNTER — EXTERNAL LAB (OUTPATIENT)
Dept: OTHER | Age: 73
End: 2023-03-23

## 2023-03-23 ENCOUNTER — EXTERNAL RECORD (OUTPATIENT)
Dept: HEALTH INFORMATION MANAGEMENT | Facility: OTHER | Age: 73
End: 2023-03-23

## 2023-03-23 LAB
APPEARANCE UR: ABNORMAL
BACTERIA #/AREA URNS HPF: ABNORMAL /HPF
BACTERIA UR CULT: YES
BILIRUB UR QL: NEGATIVE MG/DL
COLOR UR: YELLOW
GLUCOSE UR-MCNC: NEGATIVE MG/DL
HGB UR QL: 0.06 MG/DL
KETONES UR-MCNC: NEGATIVE MG/DL
LAB RESULT: NORMAL
LEUKOCYTE ESTERASE UR QL STRIP: 500 CELLS/MICROL
MICROSCOPY, U: YES
NITRITE UR QL: NEGATIVE
PH UR: 5.5 [PH] (ref 5–8)
PROT UR QL: 30 MG/DL
RBC #/AREA URNS HPF: ABNORMAL /HPF (ref 0–2)
SP GR UR: >1.03 (ref 1–1.03)
SPECIMEN SOURCE: ABNORMAL
UROBILINOGEN UR QL: NORMAL MG/DL
WBC #/AREA URNS HPF: >50 /HPF

## 2023-04-03 ENCOUNTER — APPOINTMENT (OUTPATIENT)
Dept: LAB | Age: 73
End: 2023-04-03

## 2023-04-03 ENCOUNTER — OFFICE VISIT (OUTPATIENT)
Dept: FAMILY MEDICINE | Age: 73
End: 2023-04-03

## 2023-04-03 VITALS
WEIGHT: 141.65 LBS | HEART RATE: 85 BPM | SYSTOLIC BLOOD PRESSURE: 127 MMHG | BODY MASS INDEX: 26.07 KG/M2 | DIASTOLIC BLOOD PRESSURE: 68 MMHG | HEIGHT: 62 IN | OXYGEN SATURATION: 96 %

## 2023-04-03 DIAGNOSIS — I10 ESSENTIAL (PRIMARY) HYPERTENSION: ICD-10-CM

## 2023-04-03 DIAGNOSIS — N30.00 ACUTE CYSTITIS WITHOUT HEMATURIA: Primary | ICD-10-CM

## 2023-04-03 DIAGNOSIS — E78.2 MIXED HYPERLIPIDEMIA: ICD-10-CM

## 2023-04-03 LAB
APPEARANCE, POC: CLEAR
BILIRUBIN, POC: NEGATIVE
COLOR, POC: YELLOW
GLUCOSE UR-MCNC: NEGATIVE MG/DL
KETONES, POC: NEGATIVE MG/DL
NITRITE, POC: NEGATIVE
OCCULT BLOOD, POC: ABNORMAL
PH UR: 7 [PH] (ref 5–7)
PROT UR-MCNC: NEGATIVE MG/DL
SP GR UR: 1.02 (ref 1–1.03)
UROBILINOGEN UR-MCNC: 0.2 MG/DL (ref 0–1)
WBC (LEUKOCYTE) ESTERASE, POC: NEGATIVE

## 2023-04-03 PROCEDURE — 99214 OFFICE O/P EST MOD 30 MIN: CPT | Performed by: FAMILY MEDICINE

## 2023-04-03 PROCEDURE — 81003 URINALYSIS AUTO W/O SCOPE: CPT | Performed by: FAMILY MEDICINE

## 2023-04-03 ASSESSMENT — PATIENT HEALTH QUESTIONNAIRE - PHQ9
SUM OF ALL RESPONSES TO PHQ9 QUESTIONS 1 AND 2: 1
2. FEELING DOWN, DEPRESSED OR HOPELESS: SEVERAL DAYS
CLINICAL INTERPRETATION OF PHQ2 SCORE: NO FURTHER SCREENING NEEDED
SUM OF ALL RESPONSES TO PHQ9 QUESTIONS 1 AND 2: 1
1. LITTLE INTEREST OR PLEASURE IN DOING THINGS: NOT AT ALL

## 2023-04-03 ASSESSMENT — ENCOUNTER SYMPTOMS
GASTROINTESTINAL NEGATIVE: 1
CONSTITUTIONAL NEGATIVE: 1
PSYCHIATRIC NEGATIVE: 1
NEUROLOGICAL NEGATIVE: 1
RESPIRATORY NEGATIVE: 1

## 2023-04-18 ENCOUNTER — OFFICE VISIT (OUTPATIENT)
Dept: FAMILY MEDICINE | Age: 73
End: 2023-04-18

## 2023-04-18 VITALS
OXYGEN SATURATION: 96 % | BODY MASS INDEX: 25.92 KG/M2 | HEART RATE: 91 BPM | DIASTOLIC BLOOD PRESSURE: 72 MMHG | WEIGHT: 140.87 LBS | HEIGHT: 62 IN | SYSTOLIC BLOOD PRESSURE: 132 MMHG

## 2023-04-18 DIAGNOSIS — H90.6 MIXED CONDUCTIVE AND SENSORINEURAL HEARING LOSS OF BOTH EARS: ICD-10-CM

## 2023-04-18 DIAGNOSIS — L72.9 SUBCUTANEOUS CYST: Primary | ICD-10-CM

## 2023-04-18 PROCEDURE — 99214 OFFICE O/P EST MOD 30 MIN: CPT | Performed by: FAMILY MEDICINE

## 2023-04-18 RX ORDER — MEMANTINE HYDROCHLORIDE 10 MG/1
TABLET ORAL
Qty: 60 TABLET | Refills: 5 | Status: SHIPPED | OUTPATIENT
Start: 2023-04-18 | End: 2023-10-24

## 2023-04-18 ASSESSMENT — PATIENT HEALTH QUESTIONNAIRE - PHQ9
CLINICAL INTERPRETATION OF PHQ2 SCORE: NO FURTHER SCREENING NEEDED
SUM OF ALL RESPONSES TO PHQ9 QUESTIONS 1 AND 2: 2
1. LITTLE INTEREST OR PLEASURE IN DOING THINGS: SEVERAL DAYS
SUM OF ALL RESPONSES TO PHQ9 QUESTIONS 1 AND 2: 2
2. FEELING DOWN, DEPRESSED OR HOPELESS: SEVERAL DAYS

## 2023-04-18 ASSESSMENT — ENCOUNTER SYMPTOMS
PSYCHIATRIC NEGATIVE: 1
CONSTITUTIONAL NEGATIVE: 1
NEUROLOGICAL NEGATIVE: 1
RESPIRATORY NEGATIVE: 1
GASTROINTESTINAL NEGATIVE: 1

## 2023-04-25 ENCOUNTER — EXTERNAL RECORD (OUTPATIENT)
Dept: HEALTH INFORMATION MANAGEMENT | Facility: OTHER | Age: 73
End: 2023-04-25

## 2023-05-02 RX ORDER — QUETIAPINE FUMARATE 50 MG/1
TABLET, FILM COATED ORAL
Qty: 30 TABLET | Refills: 2 | Status: SHIPPED | OUTPATIENT
Start: 2023-05-02 | End: 2023-08-08

## 2023-05-04 ENCOUNTER — EXTERNAL RECORD (OUTPATIENT)
Dept: HEALTH INFORMATION MANAGEMENT | Facility: OTHER | Age: 73
End: 2023-05-04

## 2023-05-10 RX ORDER — NIFEDIPINE 90 MG/1
TABLET, FILM COATED, EXTENDED RELEASE ORAL
Qty: 30 TABLET | Refills: 2 | Status: SHIPPED | OUTPATIENT
Start: 2023-05-10 | End: 2023-08-08

## 2023-06-02 ENCOUNTER — EXTERNAL RECORD (OUTPATIENT)
Dept: HEALTH INFORMATION MANAGEMENT | Facility: OTHER | Age: 73
End: 2023-06-02

## 2023-06-16 RX ORDER — OXYBUTYNIN CHLORIDE 5 MG/1
TABLET ORAL
Qty: 90 TABLET | Refills: 1 | Status: SHIPPED | OUTPATIENT
Start: 2023-06-16

## 2023-06-21 ENCOUNTER — TELEPHONE (OUTPATIENT)
Dept: SCHEDULING | Age: 73
End: 2023-06-21

## 2023-07-10 RX ORDER — ESCITALOPRAM OXALATE 20 MG/1
TABLET ORAL
Qty: 30 TABLET | Refills: 5 | Status: SHIPPED | OUTPATIENT
Start: 2023-07-10

## 2023-08-08 RX ORDER — NIFEDIPINE 90 MG/1
TABLET, FILM COATED, EXTENDED RELEASE ORAL
Qty: 30 TABLET | Refills: 2 | Status: SHIPPED | OUTPATIENT
Start: 2023-08-08

## 2023-08-08 RX ORDER — QUETIAPINE FUMARATE 50 MG/1
TABLET, FILM COATED ORAL
Qty: 30 TABLET | Refills: 2 | Status: SHIPPED | OUTPATIENT
Start: 2023-08-08 | End: 2023-10-24

## 2023-08-30 ENCOUNTER — OFFICE VISIT (OUTPATIENT)
Dept: FAMILY MEDICINE | Age: 73
End: 2023-08-30

## 2023-08-30 VITALS
WEIGHT: 139.77 LBS | DIASTOLIC BLOOD PRESSURE: 69 MMHG | SYSTOLIC BLOOD PRESSURE: 122 MMHG | RESPIRATION RATE: 16 BRPM | BODY MASS INDEX: 25.72 KG/M2 | HEART RATE: 81 BPM | HEIGHT: 62 IN | OXYGEN SATURATION: 96 %

## 2023-08-30 DIAGNOSIS — F41.9 ANXIETY: ICD-10-CM

## 2023-08-30 DIAGNOSIS — E78.2 MIXED HYPERLIPIDEMIA: ICD-10-CM

## 2023-08-30 DIAGNOSIS — K21.9 GASTROESOPHAGEAL REFLUX DISEASE WITHOUT ESOPHAGITIS: ICD-10-CM

## 2023-08-30 DIAGNOSIS — R42 DIZZINESS: ICD-10-CM

## 2023-08-30 DIAGNOSIS — F02.80 ALZHEIMER'S DISEASE (CMD): ICD-10-CM

## 2023-08-30 DIAGNOSIS — I10 ESSENTIAL (PRIMARY) HYPERTENSION: Primary | ICD-10-CM

## 2023-08-30 DIAGNOSIS — Z23 FLU VACCINE NEED: ICD-10-CM

## 2023-08-30 DIAGNOSIS — G30.9 ALZHEIMER'S DISEASE (CMD): ICD-10-CM

## 2023-08-30 PROCEDURE — G0008 ADMIN INFLUENZA VIRUS VAC: HCPCS | Performed by: FAMILY MEDICINE

## 2023-08-30 PROCEDURE — 99214 OFFICE O/P EST MOD 30 MIN: CPT | Performed by: FAMILY MEDICINE

## 2023-08-30 PROCEDURE — 90662 IIV NO PRSV INCREASED AG IM: CPT | Performed by: FAMILY MEDICINE

## 2023-08-30 RX ORDER — POLYETHYLENE GLYCOL 3350 17 G/17G
17 POWDER, FOR SOLUTION ORAL DAILY
COMMUNITY

## 2023-08-30 RX ORDER — VENLAFAXINE HYDROCHLORIDE 150 MG/1
1 CAPSULE, EXTENDED RELEASE ORAL DAILY
COMMUNITY

## 2023-08-30 ASSESSMENT — ENCOUNTER SYMPTOMS
NEUROLOGICAL NEGATIVE: 1
GASTROINTESTINAL NEGATIVE: 1
PSYCHIATRIC NEGATIVE: 1
RESPIRATORY NEGATIVE: 1
CONSTITUTIONAL NEGATIVE: 1

## 2023-09-12 ENCOUNTER — LAB ENCOUNTER (OUTPATIENT)
Dept: LAB | Age: 73
End: 2023-09-12
Attending: INTERNAL MEDICINE
Payer: MEDICARE

## 2023-09-12 DIAGNOSIS — Z82.49 FAMILY HISTORY OF ISCHEMIC HEART DISEASE: Primary | ICD-10-CM

## 2023-09-12 LAB
ALBUMIN SERPL-MCNC: 3.9 G/DL (ref 3.4–5)
ALBUMIN/GLOB SERPL: 1.3 {RATIO} (ref 1–2)
ALP LIVER SERPL-CCNC: 111 U/L
ALT SERPL-CCNC: 24 U/L
ANION GAP SERPL CALC-SCNC: 6 MMOL/L (ref 0–18)
AST SERPL-CCNC: 17 U/L (ref 15–37)
BILIRUB SERPL-MCNC: 0.4 MG/DL (ref 0.1–2)
BUN BLD-MCNC: 12 MG/DL (ref 7–18)
CALCIUM BLD-MCNC: 8.9 MG/DL (ref 8.5–10.1)
CHLORIDE SERPL-SCNC: 107 MMOL/L (ref 98–112)
CHOLEST SERPL-MCNC: 135 MG/DL (ref ?–200)
CO2 SERPL-SCNC: 27 MMOL/L (ref 21–32)
CREAT BLD-MCNC: 1.07 MG/DL
EGFRCR SERPLBLD CKD-EPI 2021: 55 ML/MIN/1.73M2 (ref 60–?)
FASTING PATIENT LIPID ANSWER: YES
FASTING STATUS PATIENT QL REPORTED: YES
GLOBULIN PLAS-MCNC: 2.9 G/DL (ref 2.8–4.4)
GLUCOSE BLD-MCNC: 101 MG/DL (ref 70–99)
HDLC SERPL-MCNC: 47 MG/DL (ref 40–59)
LDLC SERPL CALC-MCNC: 71 MG/DL (ref ?–100)
NONHDLC SERPL-MCNC: 88 MG/DL (ref ?–130)
OSMOLALITY SERPL CALC.SUM OF ELEC: 290 MOSM/KG (ref 275–295)
POTASSIUM SERPL-SCNC: 3.5 MMOL/L (ref 3.5–5.1)
PROT SERPL-MCNC: 6.8 G/DL (ref 6.4–8.2)
SODIUM SERPL-SCNC: 140 MMOL/L (ref 136–145)
TRIGL SERPL-MCNC: 92 MG/DL (ref 30–149)
VLDLC SERPL CALC-MCNC: 14 MG/DL (ref 0–30)

## 2023-09-12 PROCEDURE — 36415 COLL VENOUS BLD VENIPUNCTURE: CPT

## 2023-09-12 PROCEDURE — 80061 LIPID PANEL: CPT

## 2023-09-12 PROCEDURE — 80053 COMPREHEN METABOLIC PANEL: CPT

## 2023-10-24 RX ORDER — MEMANTINE HYDROCHLORIDE 10 MG/1
TABLET ORAL
Qty: 60 TABLET | Refills: 5 | Status: SHIPPED | OUTPATIENT
Start: 2023-10-24

## 2023-10-24 RX ORDER — QUETIAPINE FUMARATE 50 MG/1
TABLET, FILM COATED ORAL
Qty: 30 TABLET | Refills: 2 | Status: SHIPPED | OUTPATIENT
Start: 2023-10-24

## 2023-11-08 ENCOUNTER — TELEPHONE (OUTPATIENT)
Dept: FAMILY MEDICINE | Age: 73
End: 2023-11-08

## 2023-11-20 RX ORDER — NIFEDIPINE 90 MG/1
TABLET, FILM COATED, EXTENDED RELEASE ORAL
Qty: 30 TABLET | Refills: 2 | Status: SHIPPED | OUTPATIENT
Start: 2023-11-20

## 2023-11-22 ENCOUNTER — TELEPHONE (OUTPATIENT)
Dept: FAMILY MEDICINE | Age: 73
End: 2023-11-22

## 2023-11-28 RX ORDER — OXYBUTYNIN CHLORIDE 5 MG/1
TABLET ORAL
Qty: 30 TABLET | Refills: 5 | Status: SHIPPED | OUTPATIENT
Start: 2023-11-28

## 2023-12-01 ENCOUNTER — OFFICE VISIT (OUTPATIENT)
Dept: FAMILY MEDICINE | Age: 73
End: 2023-12-01

## 2023-12-01 VITALS
SYSTOLIC BLOOD PRESSURE: 137 MMHG | DIASTOLIC BLOOD PRESSURE: 73 MMHG | OXYGEN SATURATION: 96 % | BODY MASS INDEX: 25.19 KG/M2 | WEIGHT: 136.91 LBS | HEART RATE: 64 BPM | HEIGHT: 62 IN

## 2023-12-01 DIAGNOSIS — Z11.59 NEED FOR HEPATITIS C SCREENING TEST: ICD-10-CM

## 2023-12-01 DIAGNOSIS — F02.80 ALZHEIMER'S DISEASE (CMD): ICD-10-CM

## 2023-12-01 DIAGNOSIS — F41.9 ANXIETY: ICD-10-CM

## 2023-12-01 DIAGNOSIS — Z00.00 MEDICARE ANNUAL WELLNESS VISIT, SUBSEQUENT: Primary | ICD-10-CM

## 2023-12-01 DIAGNOSIS — G30.9 ALZHEIMER'S DISEASE (CMD): ICD-10-CM

## 2023-12-01 DIAGNOSIS — I10 ESSENTIAL (PRIMARY) HYPERTENSION: ICD-10-CM

## 2023-12-01 DIAGNOSIS — E78.2 MIXED HYPERLIPIDEMIA: ICD-10-CM

## 2023-12-01 DIAGNOSIS — K21.9 GASTROESOPHAGEAL REFLUX DISEASE WITHOUT ESOPHAGITIS: ICD-10-CM

## 2023-12-01 DIAGNOSIS — Z93.3 COLOSTOMY STATUS (CMD): ICD-10-CM

## 2023-12-01 PROCEDURE — 99214 OFFICE O/P EST MOD 30 MIN: CPT | Performed by: FAMILY MEDICINE

## 2023-12-01 PROCEDURE — G0439 PPPS, SUBSEQ VISIT: HCPCS | Performed by: FAMILY MEDICINE

## 2023-12-01 ASSESSMENT — VISUAL ACUITY
OS_CC: 20/20
OD_CC: 20/20

## 2023-12-01 ASSESSMENT — PATIENT HEALTH QUESTIONNAIRE - PHQ9
SUM OF ALL RESPONSES TO PHQ9 QUESTIONS 1 AND 2: 2
CLINICAL INTERPRETATION OF PHQ2 SCORE: NO FURTHER SCREENING NEEDED
2. FEELING DOWN, DEPRESSED OR HOPELESS: SEVERAL DAYS
SUM OF ALL RESPONSES TO PHQ9 QUESTIONS 1 AND 2: 2
1. LITTLE INTEREST OR PLEASURE IN DOING THINGS: SEVERAL DAYS

## 2023-12-01 ASSESSMENT — MINI COG
TOTAL SCORE: 5
PATIENT ABLE TO FILL IN THE CLOCK FACE WITH 10 MINUTES PAST 11 O'CLOCK?: YES, CLOCK IS CORRECT
PATIENT ABLE TO REPEAT THE 3 WORDS GIVEN PREVIOUSLY?: WAS ABLE TO REPEAT BACK 3 WORDS CORRECTLY
PATIENT WAS GIVEN REPEAT BACK WORDS FROM VERSION: 1 - BANANA SUNRISE CHAIR

## 2023-12-01 ASSESSMENT — ENCOUNTER SYMPTOMS
CONSTITUTIONAL NEGATIVE: 1
NEUROLOGICAL NEGATIVE: 1
PSYCHIATRIC NEGATIVE: 1
GASTROINTESTINAL NEGATIVE: 1
RESPIRATORY NEGATIVE: 1

## 2023-12-03 ASSESSMENT — MINI COG
PATIENT WAS GIVEN REPEAT BACK WORDS FROM VERSION: 1 - BANANA SUNRISE CHAIR
PATIENT ABLE TO FILL IN THE CLOCK FACE WITH 10 MINUTES PAST 11 O'CLOCK?: YES, CLOCK IS CORRECT
PATIENT ABLE TO REPEAT THE 3 WORDS GIVEN PREVIOUSLY?: WAS ABLE TO REPEAT BACK 3 WORDS CORRECTLY
TOTAL SCORE: 5

## 2023-12-12 ENCOUNTER — TELEPHONE (OUTPATIENT)
Dept: OTHER | Age: 73
End: 2023-12-12

## 2024-01-21 NOTE — TELEPHONE ENCOUNTER
Pt no show for PT appt; called and LM to follow up and remind of next appt. S/P C3L S/P C3L S/P C3L acute cholecystitis,  Both cystitic duct and artery identified, clipped and divided  no spillage

## 2024-01-29 ENCOUNTER — EXTERNAL RECORD (OUTPATIENT)
Dept: HEALTH INFORMATION MANAGEMENT | Facility: OTHER | Age: 74
End: 2024-01-29

## 2024-01-31 ENCOUNTER — EXTERNAL RECORD (OUTPATIENT)
Dept: HEALTH INFORMATION MANAGEMENT | Facility: OTHER | Age: 74
End: 2024-01-31

## 2024-02-05 ENCOUNTER — EXTERNAL RECORD (OUTPATIENT)
Dept: HEALTH INFORMATION MANAGEMENT | Facility: OTHER | Age: 74
End: 2024-02-05

## 2024-02-08 ENCOUNTER — EXTERNAL RECORD (OUTPATIENT)
Dept: HEALTH INFORMATION MANAGEMENT | Facility: OTHER | Age: 74
End: 2024-02-08

## 2024-02-20 ENCOUNTER — EXTERNAL RECORD (OUTPATIENT)
Dept: HEALTH INFORMATION MANAGEMENT | Facility: OTHER | Age: 74
End: 2024-02-20

## 2024-02-26 ENCOUNTER — EXTERNAL RECORD (OUTPATIENT)
Dept: HEALTH INFORMATION MANAGEMENT | Facility: OTHER | Age: 74
End: 2024-02-26

## 2024-04-08 ENCOUNTER — TELEPHONE (OUTPATIENT)
Dept: FAMILY MEDICINE | Age: 74
End: 2024-04-08

## 2024-04-15 ENCOUNTER — EXTERNAL RECORD (OUTPATIENT)
Dept: HEALTH INFORMATION MANAGEMENT | Facility: OTHER | Age: 74
End: 2024-04-15

## 2024-04-28 ENCOUNTER — EXTERNAL RECORD (OUTPATIENT)
Dept: HEALTH INFORMATION MANAGEMENT | Facility: OTHER | Age: 74
End: 2024-04-28

## 2024-04-29 ENCOUNTER — TELEPHONE (OUTPATIENT)
Dept: FAMILY MEDICINE | Age: 74
End: 2024-04-29

## 2024-05-02 ENCOUNTER — EXTERNAL RECORD (OUTPATIENT)
Dept: HEALTH INFORMATION MANAGEMENT | Facility: OTHER | Age: 74
End: 2024-05-02

## 2024-05-10 ENCOUNTER — TELEPHONE (OUTPATIENT)
Dept: FAMILY MEDICINE | Age: 74
End: 2024-05-10

## 2024-06-12 ENCOUNTER — TELEPHONE (OUTPATIENT)
Dept: FAMILY MEDICINE | Age: 74
End: 2024-06-12

## 2024-06-14 ENCOUNTER — TELEPHONE (OUTPATIENT)
Dept: FAMILY MEDICINE | Age: 74
End: 2024-06-14

## 2024-06-17 ENCOUNTER — EXTERNAL RECORD (OUTPATIENT)
Dept: HEALTH INFORMATION MANAGEMENT | Facility: OTHER | Age: 74
End: 2024-06-17

## 2024-06-24 ENCOUNTER — OFFICE VISIT (OUTPATIENT)
Dept: FAMILY MEDICINE | Age: 74
End: 2024-06-24

## 2024-06-24 ENCOUNTER — APPOINTMENT (OUTPATIENT)
Dept: FAMILY MEDICINE | Age: 74
End: 2024-06-24

## 2024-06-24 VITALS
HEART RATE: 57 BPM | HEIGHT: 62 IN | SYSTOLIC BLOOD PRESSURE: 155 MMHG | WEIGHT: 138.01 LBS | BODY MASS INDEX: 25.4 KG/M2 | DIASTOLIC BLOOD PRESSURE: 75 MMHG | OXYGEN SATURATION: 98 %

## 2024-06-24 DIAGNOSIS — F02.80 ALZHEIMER'S DISEASE  (CMD): ICD-10-CM

## 2024-06-24 DIAGNOSIS — H60.502 ACUTE OTITIS EXTERNA OF LEFT EAR, UNSPECIFIED TYPE: ICD-10-CM

## 2024-06-24 DIAGNOSIS — F41.9 ANXIETY: ICD-10-CM

## 2024-06-24 DIAGNOSIS — I10 ESSENTIAL (PRIMARY) HYPERTENSION: ICD-10-CM

## 2024-06-24 DIAGNOSIS — Z93.3 COLOSTOMY STATUS  (CMD): ICD-10-CM

## 2024-06-24 DIAGNOSIS — F33.42 RECURRENT MAJOR DEPRESSIVE DISORDER, IN FULL REMISSION (CMD): ICD-10-CM

## 2024-06-24 DIAGNOSIS — E78.2 MIXED HYPERLIPIDEMIA: Primary | ICD-10-CM

## 2024-06-24 DIAGNOSIS — K21.9 GASTROESOPHAGEAL REFLUX DISEASE WITHOUT ESOPHAGITIS: ICD-10-CM

## 2024-06-24 DIAGNOSIS — G30.9 ALZHEIMER'S DISEASE  (CMD): ICD-10-CM

## 2024-06-24 PROCEDURE — 99214 OFFICE O/P EST MOD 30 MIN: CPT | Performed by: FAMILY MEDICINE

## 2024-06-24 ASSESSMENT — PATIENT HEALTH QUESTIONNAIRE - PHQ9
CLINICAL INTERPRETATION OF PHQ2 SCORE: NO FURTHER SCREENING NEEDED
1. LITTLE INTEREST OR PLEASURE IN DOING THINGS: NOT AT ALL
2. FEELING DOWN, DEPRESSED OR HOPELESS: NOT AT ALL
SUM OF ALL RESPONSES TO PHQ9 QUESTIONS 1 AND 2: 0
SUM OF ALL RESPONSES TO PHQ9 QUESTIONS 1 AND 2: 0

## 2024-06-24 ASSESSMENT — ENCOUNTER SYMPTOMS
RESPIRATORY NEGATIVE: 1
GASTROINTESTINAL NEGATIVE: 1
NEUROLOGICAL NEGATIVE: 1
CONSTITUTIONAL NEGATIVE: 1
PSYCHIATRIC NEGATIVE: 1

## 2024-06-25 ENCOUNTER — LAB SERVICES (OUTPATIENT)
Dept: LAB | Age: 74
End: 2024-06-25

## 2024-06-25 ENCOUNTER — TELEPHONE (OUTPATIENT)
Dept: FAMILY MEDICINE | Age: 74
End: 2024-06-25

## 2024-06-25 DIAGNOSIS — E78.2 MIXED HYPERLIPIDEMIA: ICD-10-CM

## 2024-06-25 DIAGNOSIS — I10 ESSENTIAL (PRIMARY) HYPERTENSION: Primary | ICD-10-CM

## 2024-06-25 DIAGNOSIS — R32 URINARY INCONTINENCE, UNSPECIFIED TYPE: ICD-10-CM

## 2024-06-25 DIAGNOSIS — I10 ESSENTIAL (PRIMARY) HYPERTENSION: ICD-10-CM

## 2024-06-25 DIAGNOSIS — F02.80 ALZHEIMER'S DISEASE  (CMD): ICD-10-CM

## 2024-06-25 DIAGNOSIS — F41.9 ANXIETY: ICD-10-CM

## 2024-06-25 DIAGNOSIS — Z00.00 MEDICARE ANNUAL WELLNESS VISIT, SUBSEQUENT: ICD-10-CM

## 2024-06-25 DIAGNOSIS — G30.9 ALZHEIMER'S DISEASE  (CMD): ICD-10-CM

## 2024-06-25 PROCEDURE — 80061 LIPID PANEL: CPT | Performed by: CLINICAL MEDICAL LABORATORY

## 2024-06-25 PROCEDURE — 84443 ASSAY THYROID STIM HORMONE: CPT | Performed by: CLINICAL MEDICAL LABORATORY

## 2024-06-25 PROCEDURE — 80053 COMPREHEN METABOLIC PANEL: CPT | Performed by: CLINICAL MEDICAL LABORATORY

## 2024-06-25 PROCEDURE — 36415 COLL VENOUS BLD VENIPUNCTURE: CPT | Performed by: FAMILY MEDICINE

## 2024-06-25 PROCEDURE — 85025 COMPLETE CBC W/AUTO DIFF WBC: CPT | Performed by: CLINICAL MEDICAL LABORATORY

## 2024-06-25 RX ORDER — ATORVASTATIN CALCIUM 40 MG/1
40 TABLET, FILM COATED ORAL DAILY
Qty: 90 TABLET | Refills: 1 | Status: SHIPPED | OUTPATIENT
Start: 2024-06-25 | End: 2025-06-25

## 2024-06-25 RX ORDER — QUETIAPINE FUMARATE 50 MG/1
50 TABLET, FILM COATED ORAL AT BEDTIME
Qty: 90 TABLET | Refills: 1 | Status: SHIPPED | OUTPATIENT
Start: 2024-06-25 | End: 2025-06-25

## 2024-06-25 RX ORDER — NIFEDIPINE 90 MG/1
90 TABLET, FILM COATED, EXTENDED RELEASE ORAL DAILY
Qty: 90 TABLET | Refills: 1 | Status: SHIPPED | OUTPATIENT
Start: 2024-06-25 | End: 2025-06-25

## 2024-06-25 RX ORDER — OXYBUTYNIN CHLORIDE 5 MG/1
5 TABLET ORAL DAILY
Qty: 90 TABLET | Refills: 1 | Status: SHIPPED | OUTPATIENT
Start: 2024-06-25 | End: 2025-06-25

## 2024-06-25 RX ORDER — ESCITALOPRAM OXALATE 20 MG/1
20 TABLET ORAL DAILY
Qty: 90 TABLET | Refills: 1 | Status: SHIPPED | OUTPATIENT
Start: 2024-06-25 | End: 2025-06-25

## 2024-06-25 RX ORDER — MEMANTINE HYDROCHLORIDE 10 MG/1
10 TABLET ORAL 2 TIMES DAILY
Qty: 180 TABLET | Refills: 3 | Status: SHIPPED | OUTPATIENT
Start: 2024-06-25 | End: 2025-06-25

## 2024-06-26 LAB
ALBUMIN SERPL-MCNC: 3.9 G/DL (ref 3.6–5.1)
ALBUMIN/GLOB SERPL: 1.4 {RATIO} (ref 1–2.4)
ALP SERPL-CCNC: 123 UNITS/L (ref 45–117)
ALT SERPL-CCNC: 17 UNITS/L
ANION GAP SERPL CALC-SCNC: 8 MMOL/L (ref 7–19)
AST SERPL-CCNC: 10 UNITS/L
BASOPHILS # BLD: 0.1 K/MCL (ref 0–0.3)
BASOPHILS NFR BLD: 2 %
BILIRUB SERPL-MCNC: 0.5 MG/DL (ref 0.2–1)
BUN SERPL-MCNC: 21 MG/DL (ref 6–20)
BUN/CREAT SERPL: 26 (ref 7–25)
CALCIUM SERPL-MCNC: 9.3 MG/DL (ref 8.4–10.2)
CHLORIDE SERPL-SCNC: 111 MMOL/L (ref 97–110)
CHOLEST SERPL-MCNC: 128 MG/DL
CHOLEST/HDLC SERPL: 2.7 {RATIO}
CO2 SERPL-SCNC: 30 MMOL/L (ref 21–32)
CREAT SERPL-MCNC: 0.81 MG/DL (ref 0.51–0.95)
DEPRECATED RDW RBC: 51.9 FL (ref 39–50)
EGFRCR SERPLBLD CKD-EPI 2021: 76 ML/MIN/{1.73_M2}
EOSINOPHIL # BLD: 0.2 K/MCL (ref 0–0.5)
EOSINOPHIL NFR BLD: 3 %
ERYTHROCYTE [DISTWIDTH] IN BLOOD: 14.8 % (ref 11–15)
FASTING DURATION TIME PATIENT: 10 HOURS (ref 0–999)
GLOBULIN SER-MCNC: 2.7 G/DL (ref 2–4)
GLUCOSE SERPL-MCNC: 93 MG/DL (ref 70–99)
HCT VFR BLD CALC: 46.4 % (ref 36–46.5)
HDLC SERPL-MCNC: 48 MG/DL
HGB BLD-MCNC: 14.4 G/DL (ref 12–15.5)
IMM GRANULOCYTES # BLD AUTO: 0 K/MCL (ref 0–0.2)
IMM GRANULOCYTES # BLD: 0 %
LDLC SERPL CALC-MCNC: 68 MG/DL
LYMPHOCYTES # BLD: 1.7 K/MCL (ref 1–4)
LYMPHOCYTES NFR BLD: 21 %
MCH RBC QN AUTO: 29.5 PG (ref 26–34)
MCHC RBC AUTO-ENTMCNC: 31 G/DL (ref 32–36.5)
MCV RBC AUTO: 95.1 FL (ref 78–100)
MONOCYTES # BLD: 0.7 K/MCL (ref 0.3–0.9)
MONOCYTES NFR BLD: 8 %
NEUTROPHILS # BLD: 5.3 K/MCL (ref 1.8–7.7)
NEUTROPHILS NFR BLD: 66 %
NONHDLC SERPL-MCNC: 80 MG/DL
NRBC BLD MANUAL-RTO: 0 /100 WBC
PLATELET # BLD AUTO: 207 K/MCL (ref 140–450)
POTASSIUM SERPL-SCNC: 4.5 MMOL/L (ref 3.4–5.1)
PROT SERPL-MCNC: 6.6 G/DL (ref 6.4–8.2)
RBC # BLD: 4.88 MIL/MCL (ref 4–5.2)
SODIUM SERPL-SCNC: 144 MMOL/L (ref 135–145)
TRIGL SERPL-MCNC: 61 MG/DL
TSH SERPL-ACNC: 1.13 MCUNITS/ML (ref 0.35–5)
WBC # BLD: 8 K/MCL (ref 4.2–11)

## 2024-07-01 ENCOUNTER — TELEPHONE (OUTPATIENT)
Dept: FAMILY MEDICINE | Age: 74
End: 2024-07-01

## 2024-07-01 ENCOUNTER — EXTERNAL RECORD (OUTPATIENT)
Dept: HEALTH INFORMATION MANAGEMENT | Facility: OTHER | Age: 74
End: 2024-07-01

## 2024-07-03 ENCOUNTER — OFF PREMISE (OUTPATIENT)
Dept: FAMILY MEDICINE | Age: 74
End: 2024-07-03

## 2024-07-03 DIAGNOSIS — I10 ESSENTIAL (PRIMARY) HYPERTENSION: ICD-10-CM

## 2024-07-03 DIAGNOSIS — G30.9 ALZHEIMER'S DISEASE  (CMD): ICD-10-CM

## 2024-07-03 DIAGNOSIS — F02.80 ALZHEIMER'S DISEASE  (CMD): ICD-10-CM

## 2024-07-03 DIAGNOSIS — N30.00 ACUTE CYSTITIS WITHOUT HEMATURIA: Primary | ICD-10-CM

## 2024-07-03 DIAGNOSIS — E78.2 MIXED HYPERLIPIDEMIA: ICD-10-CM

## 2024-07-03 DIAGNOSIS — F41.9 ANXIETY: ICD-10-CM

## 2024-07-03 DIAGNOSIS — Z93.3 COLOSTOMY STATUS  (CMD): ICD-10-CM

## 2024-07-15 ENCOUNTER — EXTERNAL RECORD (OUTPATIENT)
Dept: HEALTH INFORMATION MANAGEMENT | Facility: OTHER | Age: 74
End: 2024-07-15

## 2024-07-15 ENCOUNTER — TELEPHONE (OUTPATIENT)
Dept: FAMILY MEDICINE | Age: 74
End: 2024-07-15

## 2024-07-17 ENCOUNTER — TELEPHONE (OUTPATIENT)
Dept: FAMILY MEDICINE | Age: 74
End: 2024-07-17

## 2024-07-23 ENCOUNTER — TELEPHONE (OUTPATIENT)
Dept: FAMILY MEDICINE | Age: 74
End: 2024-07-23

## 2024-07-30 ENCOUNTER — APPOINTMENT (OUTPATIENT)
Dept: FAMILY MEDICINE | Age: 74
End: 2024-07-30

## 2024-08-01 ENCOUNTER — OFFICE VISIT (OUTPATIENT)
Dept: FAMILY MEDICINE | Age: 74
End: 2024-08-01

## 2024-08-01 VITALS
WEIGHT: 136.35 LBS | DIASTOLIC BLOOD PRESSURE: 74 MMHG | BODY MASS INDEX: 25.09 KG/M2 | SYSTOLIC BLOOD PRESSURE: 129 MMHG | HEIGHT: 62 IN | HEART RATE: 61 BPM | OXYGEN SATURATION: 98 %

## 2024-08-01 DIAGNOSIS — M25.561 ACUTE PAIN OF RIGHT KNEE: Primary | ICD-10-CM

## 2024-08-01 DIAGNOSIS — F32.89 OTHER DEPRESSION: ICD-10-CM

## 2024-08-01 DIAGNOSIS — E78.2 MIXED HYPERLIPIDEMIA: ICD-10-CM

## 2024-08-01 DIAGNOSIS — I10 ESSENTIAL (PRIMARY) HYPERTENSION: ICD-10-CM

## 2024-08-01 PROBLEM — F32.A DEPRESSION: Status: ACTIVE | Noted: 2024-08-01

## 2024-08-01 ASSESSMENT — PATIENT HEALTH QUESTIONNAIRE - PHQ9
2. FEELING DOWN, DEPRESSED OR HOPELESS: NOT AT ALL
1. LITTLE INTEREST OR PLEASURE IN DOING THINGS: NOT AT ALL
SUM OF ALL RESPONSES TO PHQ9 QUESTIONS 1 AND 2: 0
CLINICAL INTERPRETATION OF PHQ2 SCORE: NO FURTHER SCREENING NEEDED
SUM OF ALL RESPONSES TO PHQ9 QUESTIONS 1 AND 2: 0

## 2024-09-27 DIAGNOSIS — E78.2 MIXED HYPERLIPIDEMIA: ICD-10-CM

## 2024-09-27 RX ORDER — ATORVASTATIN CALCIUM 40 MG/1
40 TABLET, FILM COATED ORAL DAILY
Qty: 90 TABLET | Refills: 1 | Status: SHIPPED | OUTPATIENT
Start: 2024-09-27

## 2024-12-17 DIAGNOSIS — F41.9 ANXIETY: ICD-10-CM

## 2024-12-17 DIAGNOSIS — R32 URINARY INCONTINENCE, UNSPECIFIED TYPE: ICD-10-CM

## 2024-12-17 DIAGNOSIS — G30.9 ALZHEIMER'S DISEASE (CMD): ICD-10-CM

## 2024-12-17 DIAGNOSIS — F02.80 ALZHEIMER'S DISEASE (CMD): ICD-10-CM

## 2024-12-17 RX ORDER — OXYBUTYNIN CHLORIDE 5 MG/1
5 TABLET ORAL DAILY
Qty: 90 TABLET | Refills: 1 | Status: SHIPPED | OUTPATIENT
Start: 2024-12-17

## 2024-12-17 RX ORDER — ESCITALOPRAM OXALATE 20 MG/1
20 TABLET ORAL DAILY
Qty: 90 TABLET | Refills: 1 | Status: SHIPPED | OUTPATIENT
Start: 2024-12-17

## 2024-12-17 RX ORDER — QUETIAPINE FUMARATE 50 MG/1
50 TABLET, FILM COATED ORAL AT BEDTIME
Qty: 90 TABLET | Refills: 1 | Status: SHIPPED | OUTPATIENT
Start: 2024-12-17

## 2025-02-14 DIAGNOSIS — I10 ESSENTIAL (PRIMARY) HYPERTENSION: ICD-10-CM

## 2025-02-14 RX ORDER — NIFEDIPINE 90 MG/1
90 TABLET, FILM COATED, EXTENDED RELEASE ORAL DAILY
Qty: 90 TABLET | Refills: 0 | Status: SHIPPED | OUTPATIENT
Start: 2025-02-14 | End: 2026-02-14

## 2025-03-21 ENCOUNTER — TELEPHONE (OUTPATIENT)
Dept: FAMILY MEDICINE | Age: 75
End: 2025-03-21

## 2025-04-07 ENCOUNTER — TELEPHONE (OUTPATIENT)
Dept: FAMILY MEDICINE | Age: 75
End: 2025-04-07

## 2025-04-10 ENCOUNTER — TELEPHONE (OUTPATIENT)
Dept: FAMILY MEDICINE | Age: 75
End: 2025-04-10

## 2025-04-15 ENCOUNTER — APPOINTMENT (OUTPATIENT)
Dept: FAMILY MEDICINE | Age: 75
End: 2025-04-15

## 2025-04-15 VITALS
HEART RATE: 78 BPM | HEIGHT: 62 IN | SYSTOLIC BLOOD PRESSURE: 146 MMHG | BODY MASS INDEX: 26.11 KG/M2 | DIASTOLIC BLOOD PRESSURE: 79 MMHG | OXYGEN SATURATION: 97 % | WEIGHT: 141.87 LBS

## 2025-04-15 DIAGNOSIS — E78.2 MIXED HYPERLIPIDEMIA: ICD-10-CM

## 2025-04-15 DIAGNOSIS — Z11.59 NEED FOR HEPATITIS C SCREENING TEST: ICD-10-CM

## 2025-04-15 DIAGNOSIS — Z00.00 MEDICARE ANNUAL WELLNESS VISIT, SUBSEQUENT: Primary | ICD-10-CM

## 2025-04-15 DIAGNOSIS — F02.80 ALZHEIMER'S DISEASE (CMD): ICD-10-CM

## 2025-04-15 DIAGNOSIS — H90.6 MIXED CONDUCTIVE AND SENSORINEURAL HEARING LOSS OF BOTH EARS: ICD-10-CM

## 2025-04-15 DIAGNOSIS — F41.9 ANXIETY: ICD-10-CM

## 2025-04-15 DIAGNOSIS — G30.9 ALZHEIMER'S DISEASE (CMD): ICD-10-CM

## 2025-04-15 DIAGNOSIS — Z93.3 COLOSTOMY STATUS  (CMD): ICD-10-CM

## 2025-04-15 DIAGNOSIS — F32.89 OTHER DEPRESSION: ICD-10-CM

## 2025-04-15 DIAGNOSIS — R32 URINARY INCONTINENCE, UNSPECIFIED TYPE: ICD-10-CM

## 2025-04-15 DIAGNOSIS — I10 ESSENTIAL (PRIMARY) HYPERTENSION: ICD-10-CM

## 2025-04-15 ASSESSMENT — VISUAL ACUITY
OS_CC: 20/20
OD_CC: 20/20

## 2025-04-15 ASSESSMENT — PATIENT HEALTH QUESTIONNAIRE - PHQ9
SUM OF ALL RESPONSES TO PHQ9 QUESTIONS 1 AND 2: 0
SUM OF ALL RESPONSES TO PHQ9 QUESTIONS 1 AND 2: 0
1. LITTLE INTEREST OR PLEASURE IN DOING THINGS: NOT AT ALL
2. FEELING DOWN, DEPRESSED OR HOPELESS: NOT AT ALL
CLINICAL INTERPRETATION OF PHQ2 SCORE: NO FURTHER SCREENING NEEDED

## 2025-04-15 ASSESSMENT — MINI COG
PATIENT ABLE TO FILL IN THE CLOCK FACE WITH 10 MINUTES PAST 11 O'CLOCK?: YES, CLOCK IS CORRECT
TOTAL SCORE: 3
PATIENT WAS GIVEN REPEAT BACK WORDS FROM VERSION: 1 - BANANA SUNRISE CHAIR
PATIENT ABLE TO REPEAT THE 3 WORDS GIVEN PREVIOUSLY?: WAS ABLE TO REPEAT BACK 1 WORD CORRECTLY

## 2025-04-21 ENCOUNTER — TELEPHONE (OUTPATIENT)
Dept: FAMILY MEDICINE | Age: 75
End: 2025-04-21

## 2025-04-21 DIAGNOSIS — I10 ESSENTIAL (PRIMARY) HYPERTENSION: Primary | ICD-10-CM

## 2025-04-21 RX ORDER — HYDRALAZINE HYDROCHLORIDE 50 MG/1
50 TABLET, FILM COATED ORAL 2 TIMES DAILY
Qty: 60 TABLET | Refills: 3 | Status: SHIPPED | OUTPATIENT
Start: 2025-04-21

## 2025-04-22 ENCOUNTER — LAB SERVICES (OUTPATIENT)
Dept: LAB | Age: 75
End: 2025-04-22

## 2025-04-22 ENCOUNTER — APPOINTMENT (OUTPATIENT)
Dept: FAMILY MEDICINE | Age: 75
End: 2025-04-22

## 2025-04-22 VITALS
OXYGEN SATURATION: 95 % | WEIGHT: 142.09 LBS | SYSTOLIC BLOOD PRESSURE: 130 MMHG | HEART RATE: 59 BPM | HEIGHT: 62 IN | BODY MASS INDEX: 26.15 KG/M2 | DIASTOLIC BLOOD PRESSURE: 60 MMHG

## 2025-04-22 DIAGNOSIS — I10 ESSENTIAL (PRIMARY) HYPERTENSION: ICD-10-CM

## 2025-04-22 DIAGNOSIS — K21.9 GASTROESOPHAGEAL REFLUX DISEASE WITHOUT ESOPHAGITIS: ICD-10-CM

## 2025-04-22 DIAGNOSIS — Z11.59 NEED FOR HEPATITIS C SCREENING TEST: ICD-10-CM

## 2025-04-22 DIAGNOSIS — I10 ESSENTIAL (PRIMARY) HYPERTENSION: Primary | ICD-10-CM

## 2025-04-22 DIAGNOSIS — E78.2 MIXED HYPERLIPIDEMIA: ICD-10-CM

## 2025-04-22 DIAGNOSIS — R73.9 HYPERGLYCEMIA: ICD-10-CM

## 2025-04-22 PROCEDURE — 85025 COMPLETE CBC W/AUTO DIFF WBC: CPT | Performed by: CLINICAL MEDICAL LABORATORY

## 2025-04-22 PROCEDURE — 83036 HEMOGLOBIN GLYCOSYLATED A1C: CPT | Performed by: CLINICAL MEDICAL LABORATORY

## 2025-04-22 PROCEDURE — 84443 ASSAY THYROID STIM HORMONE: CPT | Performed by: CLINICAL MEDICAL LABORATORY

## 2025-04-22 PROCEDURE — 36415 COLL VENOUS BLD VENIPUNCTURE: CPT | Performed by: FAMILY MEDICINE

## 2025-04-22 PROCEDURE — 80061 LIPID PANEL: CPT | Performed by: CLINICAL MEDICAL LABORATORY

## 2025-04-22 PROCEDURE — 80053 COMPREHEN METABOLIC PANEL: CPT | Performed by: CLINICAL MEDICAL LABORATORY

## 2025-04-22 PROCEDURE — G0472 HEP C SCREEN HIGH RISK/OTHER: HCPCS | Performed by: CLINICAL MEDICAL LABORATORY

## 2025-04-22 ASSESSMENT — ENCOUNTER SYMPTOMS
CONSTITUTIONAL NEGATIVE: 1
RESPIRATORY NEGATIVE: 1
NEUROLOGICAL NEGATIVE: 1
GASTROINTESTINAL NEGATIVE: 1
PSYCHIATRIC NEGATIVE: 1

## 2025-04-22 ASSESSMENT — PATIENT HEALTH QUESTIONNAIRE - PHQ9
1. LITTLE INTEREST OR PLEASURE IN DOING THINGS: NOT AT ALL
CLINICAL INTERPRETATION OF PHQ2 SCORE: NO FURTHER SCREENING NEEDED
SUM OF ALL RESPONSES TO PHQ9 QUESTIONS 1 AND 2: 0
SUM OF ALL RESPONSES TO PHQ9 QUESTIONS 1 AND 2: 0
2. FEELING DOWN, DEPRESSED OR HOPELESS: NOT AT ALL

## 2025-04-23 LAB
ALBUMIN SERPL-MCNC: 3.8 G/DL (ref 3.4–5)
ALBUMIN/GLOB SERPL: 1.5 {RATIO} (ref 1–2.4)
ALP SERPL-CCNC: 136 UNITS/L (ref 45–117)
ALT SERPL-CCNC: 21 UNITS/L
ANION GAP SERPL CALC-SCNC: 11 MMOL/L (ref 7–19)
AST SERPL-CCNC: 11 UNITS/L
BASOPHILS # BLD: 0.1 K/MCL (ref 0–0.3)
BASOPHILS NFR BLD: 2 %
BILIRUB SERPL-MCNC: 0.5 MG/DL (ref 0.2–1)
BUN SERPL-MCNC: 18 MG/DL (ref 6–20)
BUN/CREAT SERPL: 22 (ref 7–25)
CALCIUM SERPL-MCNC: 8.9 MG/DL (ref 8.4–10.2)
CHLORIDE SERPL-SCNC: 108 MMOL/L (ref 97–110)
CHOLEST SERPL-MCNC: 129 MG/DL
CHOLEST/HDLC SERPL: 3.1 {RATIO}
CO2 SERPL-SCNC: 25 MMOL/L (ref 21–32)
CREAT SERPL-MCNC: 0.82 MG/DL (ref 0.51–0.95)
DEPRECATED RDW RBC: 45.4 FL (ref 39–50)
EGFRCR SERPLBLD CKD-EPI 2021: 74 ML/MIN/{1.73_M2}
EOSINOPHIL # BLD: 0.2 K/MCL (ref 0–0.5)
EOSINOPHIL NFR BLD: 2 %
ERYTHROCYTE [DISTWIDTH] IN BLOOD: 13.6 % (ref 11–15)
FASTING DURATION TIME PATIENT: 0 HOURS (ref 0–999)
GLOBULIN SER-MCNC: 2.6 G/DL (ref 2–4)
GLUCOSE SERPL-MCNC: 123 MG/DL (ref 70–99)
HBA1C MFR BLD: 5.8 % (ref 4.5–5.6)
HCT VFR BLD CALC: 46.5 % (ref 36–46.5)
HCV AB SER QL: NEGATIVE
HDLC SERPL-MCNC: 41 MG/DL
HGB BLD-MCNC: 15.3 G/DL (ref 12–15.5)
IMM GRANULOCYTES # BLD AUTO: 0 K/MCL (ref 0–0.2)
IMM GRANULOCYTES # BLD: 0 %
LDLC SERPL CALC-MCNC: 57 MG/DL
LYMPHOCYTES # BLD: 1.3 K/MCL (ref 1–4)
LYMPHOCYTES NFR BLD: 17 %
MCH RBC QN AUTO: 30.4 PG (ref 26–34)
MCHC RBC AUTO-ENTMCNC: 32.9 G/DL (ref 32–36.5)
MCV RBC AUTO: 92.3 FL (ref 78–100)
MONOCYTES # BLD: 0.5 K/MCL (ref 0.3–0.9)
MONOCYTES NFR BLD: 6 %
NEUTROPHILS # BLD: 5.4 K/MCL (ref 1.8–7.7)
NEUTROPHILS NFR BLD: 73 %
NONHDLC SERPL-MCNC: 88 MG/DL
NRBC BLD MANUAL-RTO: 0 /100 WBC
PLATELET # BLD AUTO: 210 K/MCL (ref 140–450)
POTASSIUM SERPL-SCNC: 4 MMOL/L (ref 3.4–5.1)
PROT SERPL-MCNC: 6.4 G/DL (ref 6.4–8.2)
RBC # BLD: 5.04 MIL/MCL (ref 4–5.2)
SODIUM SERPL-SCNC: 140 MMOL/L (ref 135–145)
TRIGL SERPL-MCNC: 154 MG/DL
TSH SERPL-ACNC: 0.71 MCUNITS/ML (ref 0.35–5)
WBC # BLD: 7.5 K/MCL (ref 4.2–11)

## 2025-04-24 ENCOUNTER — TELEPHONE (OUTPATIENT)
Dept: INTERNAL MEDICINE | Age: 75
End: 2025-04-24

## 2025-04-24 ENCOUNTER — RESULTS FOLLOW-UP (OUTPATIENT)
Dept: INTERNAL MEDICINE | Age: 75
End: 2025-04-24

## 2025-04-25 ENCOUNTER — TELEPHONE (OUTPATIENT)
Dept: FAMILY MEDICINE | Age: 75
End: 2025-04-25

## 2025-05-19 DIAGNOSIS — I10 ESSENTIAL (PRIMARY) HYPERTENSION: ICD-10-CM

## 2025-05-20 RX ORDER — NIFEDIPINE 90 MG/1
90 TABLET, FILM COATED, EXTENDED RELEASE ORAL DAILY
Qty: 90 TABLET | Refills: 0 | Status: SHIPPED | OUTPATIENT
Start: 2025-05-20 | End: 2026-05-20

## 2025-06-07 DIAGNOSIS — E78.2 MIXED HYPERLIPIDEMIA: ICD-10-CM

## 2025-06-09 RX ORDER — ATORVASTATIN CALCIUM 40 MG/1
40 TABLET, FILM COATED ORAL DAILY
Qty: 90 TABLET | Refills: 1 | Status: SHIPPED | OUTPATIENT
Start: 2025-06-09

## 2025-07-07 ENCOUNTER — TELEPHONE (OUTPATIENT)
Dept: INTERNAL MEDICINE | Age: 75
End: 2025-07-07

## 2025-07-18 DIAGNOSIS — I10 ESSENTIAL (PRIMARY) HYPERTENSION: ICD-10-CM

## 2025-07-18 RX ORDER — HYDRALAZINE HYDROCHLORIDE 50 MG/1
50 TABLET, FILM COATED ORAL 2 TIMES DAILY
Qty: 180 TABLET | Refills: 1 | Status: SHIPPED | OUTPATIENT
Start: 2025-07-18

## 2025-08-12 DIAGNOSIS — G30.9 ALZHEIMER'S DISEASE (CMD): ICD-10-CM

## 2025-08-12 DIAGNOSIS — F41.9 ANXIETY: ICD-10-CM

## 2025-08-12 DIAGNOSIS — F02.80 ALZHEIMER'S DISEASE (CMD): ICD-10-CM

## 2025-08-13 RX ORDER — ESCITALOPRAM OXALATE 20 MG/1
20 TABLET ORAL DAILY
Qty: 90 TABLET | Refills: 0 | Status: SHIPPED | OUTPATIENT
Start: 2025-08-13

## 2025-08-13 RX ORDER — QUETIAPINE FUMARATE 50 MG/1
50 TABLET, FILM COATED ORAL AT BEDTIME
Qty: 90 TABLET | Refills: 0 | Status: SHIPPED | OUTPATIENT
Start: 2025-08-13

## 2025-08-14 ENCOUNTER — TELEPHONE (OUTPATIENT)
Dept: FAMILY MEDICINE | Age: 75
End: 2025-08-14

## 2025-08-18 ENCOUNTER — TELEPHONE (OUTPATIENT)
Dept: FAMILY MEDICINE | Age: 75
End: 2025-08-18

## 2025-08-23 DIAGNOSIS — I10 ESSENTIAL (PRIMARY) HYPERTENSION: ICD-10-CM

## 2025-08-25 RX ORDER — NIFEDIPINE 90 MG/1
90 TABLET, FILM COATED, EXTENDED RELEASE ORAL DAILY
Qty: 90 TABLET | Refills: 0 | Status: SHIPPED | OUTPATIENT
Start: 2025-08-25

## 2025-09-01 DIAGNOSIS — G30.9 ALZHEIMER'S DISEASE (CMD): ICD-10-CM

## 2025-09-01 DIAGNOSIS — F02.80 ALZHEIMER'S DISEASE (CMD): ICD-10-CM

## 2025-09-02 RX ORDER — MEMANTINE HYDROCHLORIDE 10 MG/1
10 TABLET ORAL 2 TIMES DAILY
Qty: 180 TABLET | Refills: 1 | Status: SHIPPED | OUTPATIENT
Start: 2025-09-02

## (undated) DIAGNOSIS — R42 DIZZINESS: Primary | ICD-10-CM

## (undated) NOTE — LETTER
Patient Name: Virginia Hartmann  YOB: 1950          MRN :  [id number]  Date:  8/17/2022  Referring Physician:  Zane Redding    Progress Summary  Pt had attended 6 visits earlier this year with last visit 5/18/22. Multiple cancels due to other medical issues. Today pt has returned to PT with new order.  present to help with history due to pt decreased memory. Reports overall improvement but still with ongoing dizziness. Aggravated especially with quicker movements and with getting out of a chair. Reports regularly checks BP which has been under control, approx 140/70. Reports not needing the rolling walker as much; will still hold on to walls in the house or  when out of the house. No recent falls. More recently reports feeling weaker and has had to sit more because of the dizziness and recent medical issues. Has not been able to go with  for errands and walks like she was able to do. Has gone with  for some very brief errands but otherwise is not walking much. Pt/ goals to be stronger and get rid of the dizziness, be able to walk without losing balance. Pain: 0/10. Does report chronic LBP/stiffness. Objective:   Postural Control:   Romberg: EO 30 sec, EC 4 sec with dizziness. Tandem Stance: unable   SLS: unable     Cervical spine ROM: Flex 30, Ext 40,  R ROT 70, L ROT 70. No complaints   Adverse neuro signs with ROM: yes no: no      Occulomotor & Vestibulo-Ocular Exam:  Spontaneous Nystagmus: room light: none   Smooth Pursuit: Negative   Saccades: Negative  Gaze Evoked Nystagmus:  room light: Negative  Head Thrust: (+) R    Functional Mobility:   Gait: pt ambulates on level ground without device. Prefers to hold  for stability. Avoids head turns.     30 sec sit to stand:  10x no UE's    ---------------------------      5/18/22  TUG 12 sec no device  30 sec STS 10x    5/11/22  (+) R head thrust  (+) visual motion sensitivity  4/27/22  New order scanned ok to resume PT   (+) R Head thrust   Amb without device to session, tends to hold walls,  - advised more regular use of walker     4/4/22  DVA 3 line loss. TUG no device: 20 sec    Assessment: Pt returns after 3 months with new order for dx: dizziness. Over the past months, pt functionally more sedentary due to dizziness and various medical issues, with resulting weakness, decreased balance and vestibular hypofunction. Dizziness symptoms easily provoked with quicker head movements or changes of position and VOR exercises. Able to tolerate low level VOR, slow pace, with symptoms provoked. Symptoms also provoked with dynamic balance activities. Symptoms subside with rest 1-2 mins. Advised more consistent use of RW for safety and to help stabilize balance and symptoms. Goals:   (to be met in 10 visits)  Sit <--> stand transfers without aggravation of dizziness. Able to perform functional squat safely without aggravation of symptoms, to reach floor objects  Independent with HEP to abolish symptoms and restore postural balance. Ambulate with assistive device if needed, with head turns without loss of balance and maintaining a straight line. (I) stand eyes closed with narrow base of support and head movement for shower safety. Plan:  Patient will be seen for 1-2 x/week or a total of 8 visits over a 90 day period. Treatment will include: home exercise program development and instruction, patient/family education, balance training, eye/head coordination exercises, sensory organization training, optokinetic stimulation , ROM, gait training and strengthening. Patient/Family/Caregiver was advised of these findings, precautions, and treatment options and has agreed to actively participate in planning and for this course of care. Thank you for your referral. If you have any questions, please contact me at Dept: [phone].     Sincerely,  Electronically signed by therapist: Luda Berry PT    Physician's certification required: Yes  Please co-sign or sign and return this letter via fax as soon as possible to [phone]. I certify the need for these services furnished under this plan of treatment and while under my care. X___________________________________________________ Date____________________    Certification From: 1/96/1148  To:11/15/2022             21st Century Cures Act Notice to Patient: Medical documents like this are made available to patients in the interest of transparency. However, be advised this is a medical document and it is intended as [id number] communication between your medical providers. This medical document may contain abbreviations, assessments, medical data, and results or other terms that are unfamiliar. Medical documents are intended to carry relevant information, facts as evident, and the clinical opinion of the practitioner. As such, this medical document may be written in language that appears blunt or direct. You are encouraged to contact your medical provider and/or [facility] Patient Experience if you have any questions about this medical document.

## (undated) NOTE — ED AVS SNAPSHOT
[facility] in [facility] [address]    Phone:  [phone]    Fax:  Debi   MRN: [id number]    Department:  THE [facility] [place] [facility] in [facility]   Date of Visit:  2/20/2017 [address] [facility] [employer], [address]  [phone] [address]  [address]  [facility], [address] [place] [address]  [phone] [address] [place] [address] [address] [facility] [address]   [phone]       To Check ER Wait Times:  TYLER reading, you will be contacted. Please make sure we have your correct phone number before you leave. After you leave, you should follow the attached instructions. I have read and understand the instructions given to me by my caregivers.         24-Hour XR HAND (MIN 3 VIEWS), RIGHT (CPT=73130) (Final result) Result time:  02/20/17 16:50:37    Final result    Impression:    CONCLUSION:  Negative for fracture.  Moderate degenerative osteoarthritic changes most severe involving the distal inter-phalangeal lexx Expires: 3/13/2017  3:32 PM    If you have questions, you can call [phone] to talk to our [facility] Staff. Remember, [facility] is NOT to be used for urgent needs. For medical emergencies, dial 911.

## (undated) NOTE — LETTER
Patient Name: Farhan Rocha  YOB: 1950          MRN :  [id number]  Date:  9/28/2022  Referring [de-identified], M    Progress/ Discharge Summary  Pt has attended 11 total visits this year with cancels due to various medical issues in Physical Therapy. Subjective:  \"I get dizzy if I move too fast\"  Reports good and bad days. Reports sleeps a lot. Reports doing recumbent bike at home 15 mins but doesn't always remember to do it. Pain: 0/10. Objective:   Gait: Amb without device (I) during session. Gait stability can vary depending on the day and symptoms of dizziness. Pt will hold  or use RW to help manage symptoms. Recommended RW at bedside for trips to bathroom at night. Balance:  Firm Rhomberg:   Eyes open 30 sec    Eyes closed 20 sec  Foam Rhomberg 1-2 sec    8/29/22  TUG no device:  14 sec    (improved from 20 sec 4/2022     Occulomotor & Vestibulo-Ocular Exam:  Spontaneous Nystagmus: room light: none   Smooth Pursuit: Negative   Saccades: Negative  Gaze Evoked Nystagmus:  room light: Negative  Head Thrust: (+) R. Symptoms easily provoked with VOR testing. Assessment: Pt still remains limited functionally by frequent dizziness complaints. Symptoms can significantly vary depending on the day. Inconsistent follow through with HEP; Pt is attempting but multiple obstacles contributing including memory deficits, fatigue and medical issues. Promoting a low level HEP which pt can continue with at this time; with assist of written handouts and . Encouraging more regular follow through of HEP on the good days; symptoms management techniques on the bad days. Also recommended follow through with referral to geriatric psychiatrist to review medications;  reports he has the order but has not yet scheduled.             Certification From: 8/81/3875  To:11/15/2022    Goals:  Remain in progress  (to be met in 10 visits)  Sit <--> stand transfers without aggravation of dizziness. Able to perform functional squat safely without aggravation of symptoms, to reach floor objects  Independent with HEP to abolish symptoms and restore postural balance. --> met with written handouts/ assist.    Ambulate with assistive device if needed, with head turns without loss of balance and maintaining a straight line. (I) stand eyes closed with narrow base of support and head movement for shower safety. Plan:  As noted above, slow, variable progress depending on the day. Recommend d/c to HEP at this time. Pt also to follow through with referral to geriatric psychiatrist.    Pt may benefit from return to PT to complete remaining 4 visits over the next months if HEP needs to be modified. PCP follow up scheduled for early Oct.      Patient/Family/Caregiver was advised of these findings, precautions, and treatment options and has agreed to actively participate in planning and for this course of care. Thank you for your referral. If you have any questions, please contact me at Dept: [phone]. Sincerely,  Electronically signed by therapist: Karla Leija PT    Physician's certification required: No               21st Century Cures Act Notice to Patient: Medical documents like this are made available to patients in the interest of transparency. However, be advised this is a medical document and it is intended as [id number] communication between your medical providers. This medical document may contain abbreviations, assessments, medical data, and results or other terms that are unfamiliar. Medical documents are intended to carry relevant information, facts as evident, and the clinical opinion of the practitioner. As such, this medical document may be written in language that appears blunt or direct. You are encouraged to contact your medical provider and/or [facility] Patient Experience if you have any questions about this medical document.